# Patient Record
Sex: FEMALE | Race: WHITE | NOT HISPANIC OR LATINO | URBAN - METROPOLITAN AREA
[De-identification: names, ages, dates, MRNs, and addresses within clinical notes are randomized per-mention and may not be internally consistent; named-entity substitution may affect disease eponyms.]

---
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
## 2022-02-26 ENCOUNTER — TELEPHONE (OUTPATIENT)
Dept: FAMILY MEDICINE CLINIC | Facility: MEDICAL CENTER | Age: 22
End: 2022-02-26

## 2022-02-26 NOTE — TELEPHONE ENCOUNTER
Please remove [facility] as PCP patient has aged out of the practice and now goes to [address]       thank you

## 2022-05-26 NOTE — TELEPHONE ENCOUNTER
05/26/22 3:54 PM     Thank you for your request  Your request has been received, reviewed, and the patient chart updated  The PCP has successfully been removed with a patient attribution note  Please remind staff to not remove PCP at office level for this scenario; [facility] Department will remove  This message will now be completed      Thank you  Marisa Thomson

## 2024-05-20 ENCOUNTER — OFFICE VISIT (OUTPATIENT)
Dept: OBGYN CLINIC | Facility: HOSPITAL | Age: 24
End: 2024-05-20
Payer: COMMERCIAL

## 2024-05-20 ENCOUNTER — HOSPITAL ENCOUNTER (OUTPATIENT)
Dept: RADIOLOGY | Facility: HOSPITAL | Age: 24
Discharge: HOME | End: 2024-05-20
Attending: ORTHOPAEDIC SURGERY
Payer: COMMERCIAL

## 2024-05-20 VITALS — WEIGHT: 226 LBS | HEIGHT: 62 IN | BODY MASS INDEX: 41.59 KG/M2

## 2024-05-20 DIAGNOSIS — R52 PAIN: ICD-10-CM

## 2024-05-20 DIAGNOSIS — R52 PAIN: Primary | ICD-10-CM

## 2024-05-20 PROCEDURE — 72050 X-RAY EXAM NECK SPINE 4/5VWS: CPT

## 2024-05-20 PROCEDURE — 99204 OFFICE O/P NEW MOD 45 MIN: CPT | Performed by: ORTHOPAEDIC SURGERY

## 2024-05-20 RX ORDER — ALPRAZOLAM 0.25 MG/1
0.25 TABLET ORAL AS NEEDED
Qty: 2 TABLET | Refills: 0 | Status: SHIPPED | OUTPATIENT
Start: 2024-05-20

## 2024-05-20 RX ORDER — ACETAMINOPHEN 325 MG/1
500 TABLET ORAL EVERY 6 HOURS PRN
COMMUNITY

## 2024-05-20 RX ORDER — ALBUTEROL SULFATE 90 UG/1
2 AEROSOL, METERED RESPIRATORY (INHALATION) EVERY 6 HOURS PRN
COMMUNITY
Start: 2024-04-30

## 2024-05-20 RX ORDER — CYCLOBENZAPRINE HCL 5 MG
5 TABLET ORAL AS NEEDED
COMMUNITY
Start: 2024-04-24

## 2024-05-20 RX ORDER — LEVOTHYROXINE SODIUM 112 UG/1
1 TABLET ORAL DAILY
COMMUNITY
Start: 2024-03-19

## 2024-05-20 RX ORDER — LINACLOTIDE 145 UG/1
145 CAPSULE, GELATIN COATED ORAL DAILY
COMMUNITY
Start: 2024-03-07

## 2024-05-20 RX ORDER — METHOCARBAMOL 500 MG/1
500 TABLET, FILM COATED ORAL AS NEEDED
COMMUNITY
Start: 2024-04-26
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
## 2024-05-30 ENCOUNTER — HOSPITAL ENCOUNTER (OUTPATIENT)
Dept: RADIOLOGY | Facility: HOSPITAL | Age: 24
Discharge: HOME | End: 2024-05-30
Attending: ORTHOPAEDIC SURGERY
Payer: COMMERCIAL

## 2024-05-30 DIAGNOSIS — R52 PAIN: ICD-10-CM

## 2024-05-30 PROCEDURE — 72141 MRI NECK SPINE W/O DYE: CPT

## 2024-06-05 ENCOUNTER — OFFICE VISIT (OUTPATIENT)
Dept: OBGYN CLINIC | Facility: HOSPITAL | Age: 24
End: 2024-06-05
Payer: COMMERCIAL

## 2024-06-05 VITALS
WEIGHT: 227.07 LBS | HEIGHT: 62 IN | BODY MASS INDEX: 41.79 KG/M2 | HEART RATE: 106 BPM | DIASTOLIC BLOOD PRESSURE: 85 MMHG | SYSTOLIC BLOOD PRESSURE: 129 MMHG

## 2024-06-05 DIAGNOSIS — R20.0 NUMBNESS AND TINGLING IN BOTH HANDS: Primary | ICD-10-CM

## 2024-06-05 DIAGNOSIS — R20.2 NUMBNESS AND TINGLING IN BOTH HANDS: Primary | ICD-10-CM

## 2024-06-05 PROCEDURE — 99213 OFFICE O/P EST LOW 20 MIN: CPT | Performed by: ORTHOPAEDIC SURGERY
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
## 2024-07-16 ENCOUNTER — OFFICE VISIT (OUTPATIENT)
Dept: OBGYN CLINIC | Facility: HOSPITAL | Age: 24
End: 2024-07-16
Payer: COMMERCIAL

## 2024-07-16 VITALS
WEIGHT: 230.8 LBS | HEART RATE: 97 BPM | SYSTOLIC BLOOD PRESSURE: 113 MMHG | HEIGHT: 62 IN | DIASTOLIC BLOOD PRESSURE: 77 MMHG | BODY MASS INDEX: 42.47 KG/M2

## 2024-07-16 DIAGNOSIS — R20.2 NUMBNESS AND TINGLING IN BOTH HANDS: Primary | ICD-10-CM

## 2024-07-16 DIAGNOSIS — R20.0 NUMBNESS AND TINGLING IN BOTH HANDS: Primary | ICD-10-CM

## 2024-07-16 PROCEDURE — 99212 OFFICE O/P EST SF 10 MIN: CPT | Performed by: ORTHOPAEDIC SURGERY
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
## 2025-02-14 ENCOUNTER — OFFICE VISIT (OUTPATIENT)
Age: 25
End: 2025-02-14
Payer: COMMERCIAL

## 2025-02-14 VITALS
TEMPERATURE: 98.7 F | HEIGHT: 62 IN | HEART RATE: 99 BPM | WEIGHT: 236 LBS | BODY MASS INDEX: 43.43 KG/M2 | OXYGEN SATURATION: 99 %

## 2025-02-14 DIAGNOSIS — I77.4 MEDIAN ARCUATE LIGAMENT SYNDROME (HCC): Primary | ICD-10-CM

## 2025-02-14 DIAGNOSIS — R10.84 GENERALIZED ABDOMINAL PAIN: ICD-10-CM

## 2025-02-14 DIAGNOSIS — R11.2 NAUSEA AND VOMITING, UNSPECIFIED VOMITING TYPE: ICD-10-CM

## 2025-02-14 DIAGNOSIS — R19.7 DIARRHEA OF PRESUMED INFECTIOUS ORIGIN: ICD-10-CM

## 2025-02-14 DIAGNOSIS — R10.13 EPIGASTRIC PAIN: ICD-10-CM

## 2025-02-14 DIAGNOSIS — K58.2 IRRITABLE BOWEL SYNDROME WITH BOTH CONSTIPATION AND DIARRHEA: ICD-10-CM

## 2025-02-14 DIAGNOSIS — K21.9 GASTROESOPHAGEAL REFLUX DISEASE, UNSPECIFIED WHETHER ESOPHAGITIS PRESENT: ICD-10-CM

## 2025-02-14 DIAGNOSIS — R52 PAIN AGGRAVATED BY EATING OR DRINKING: ICD-10-CM

## 2025-02-14 PROCEDURE — 99204 OFFICE O/P NEW MOD 45 MIN: CPT | Performed by: NURSE PRACTITIONER

## 2025-02-14 RX ORDER — ONDANSETRON 4 MG/1
4 TABLET, ORALLY DISINTEGRATING ORAL AS NEEDED
COMMUNITY
End: 2025-02-14 | Stop reason: SDUPTHER

## 2025-02-14 RX ORDER — SENNOSIDES A AND B 8.6 MG/1
1 TABLET, FILM COATED ORAL DAILY
Qty: 30 TABLET | Refills: 2 | Status: SHIPPED | OUTPATIENT
Start: 2025-02-14

## 2025-02-14 RX ORDER — POLYETHYLENE GLYCOL 3350 17 G/17G
17 POWDER, FOR SOLUTION ORAL 2 TIMES DAILY
Qty: 578 G | Refills: 5 | Status: SHIPPED | OUTPATIENT
Start: 2025-02-14

## 2025-02-14 RX ORDER — ONDANSETRON 4 MG/1
4 TABLET, ORALLY DISINTEGRATING ORAL EVERY 6 HOURS SCHEDULED
Qty: 45 TABLET | Refills: 5 | Status: SHIPPED | OUTPATIENT
Start: 2025-02-14

## 2025-02-14 RX ORDER — BISACODYL 10 MG
10 SUPPOSITORY, RECTAL RECTAL DAILY
Qty: 12 SUPPOSITORY | Refills: 0 | Status: SHIPPED | OUTPATIENT
Start: 2025-02-14

## 2025-02-14 RX ORDER — OMEPRAZOLE 40 MG/1
40 CAPSULE, DELAYED RELEASE ORAL DAILY
Qty: 30 CAPSULE | Refills: 2 | Status: SHIPPED | OUTPATIENT
Start: 2025-02-14

## 2025-02-14 RX ORDER — FAMOTIDINE 40 MG/1
40 TABLET, FILM COATED ORAL
Qty: 30 TABLET | Refills: 2 | Status: SHIPPED | OUTPATIENT
Start: 2025-02-14

## 2025-02-14 NOTE — ASSESSMENT & PLAN NOTE
Orders:  •  Calprotectin,Fecal; Future  •  Clostridium difficile toxin by PCR with EIA; Future  •  IgA; Future  •  Ova and parasite examination; Future  •  Stool Enteric Bacterial Panel by PCR; Future  •  Fecal fat, qualitative; Future  •  Pancreatic elastase, fecal; Future

## 2025-02-14 NOTE — PATIENT INSTRUCTIONS
Proceed with HIDA Scan and U/S.  Proceed with consultation with Dr. Gonzales.   Proceed with stool studies as discussed.   Omeprazole 40 mg, 1 pill daily in AM prior to a meal.   Famotidine 40 mg, 1 pill daily at bed time.   Continue acidic or trigger foods as much as possible.   Start Miralax, 1 capful daily, can increase up to a max of 2 per day.   Start Senokot 1 pill at bed time, can increase to 2 pills.   IF you have NOT had a BM in 2-3 days, insert 1 Dulcolax suppository rectally at bed time.   Continue to try to drink at least 32-64 oz of water daily.   Can use Zofran every 6 hours as needed for nausea.   Continue to watch for red flag symptoms.   Schedule a f/u OV in 3 months.     We did review GI red flag symptoms, including, but not limited to: chronic nausea, vomiting, diarrhea, chills, fever, and unintentional weight loss and should call or contact our office with any changes or concerns. I reviewed with the patient that if they notice any blood while vomiting or in their stool they should contact or office or go to the nearest emergency room for immediate evaluation. The patient was agreeable and verbalized an understanding.

## 2025-02-14 NOTE — ASSESSMENT & PLAN NOTE
Can continue Zofran as needed for nausea and vomiting.  Encouraged the patient continue to try to stay hydrated, by drinking 32 to 64 ounces of water daily.  Orders:  •  ondansetron (ZOFRAN-ODT) 4 mg disintegrating tablet; Take 1 tablet (4 mg total) by mouth every 6 (six) hours  •  Ambulatory Referral to General Surgery; Future

## 2025-02-14 NOTE — ASSESSMENT & PLAN NOTE
Encouraged patient to proceed with a HIDA scan as scheduled later on this month as I also feel would be a good idea to rule out the gallbladder is another underlying possible component of her symptoms.  Orders:  •  Ambulatory Referral to General Surgery; Future

## 2025-02-14 NOTE — PROGRESS NOTES
Name: Annalise Do      : 2000      MRN: [id number]  Encounter Provider: SAMRA Osorio  Encounter Date: 2025   Encounter department: [facility]NE SMALL  :  Assessment & Plan  Median arcuate ligament syndrome (HCC)  While the patient was in the office today, I discussed with the patient that with the reason celiac/mesenteric duplex being suggestive of MALS and her brother does have a confirmed diagnosis, with similar symptoms, I do feel would be in her best interest to proceed with a repeat ultrasound/duplex as ordered later on this month and proceed with a consultation with Dr. Gonzales for further evaluation and possible treatment options.  The patient was agreeable and verbalized an understanding.    Orders:  •  Ambulatory Referral to General Surgery; Future    Nausea and vomiting, unspecified vomiting type  Can continue Zofran as needed for nausea and vomiting.  Encouraged the patient continue to try to stay hydrated, by drinking 32 to 64 ounces of water daily.  Orders:  •  ondansetron (ZOFRAN-ODT) 4 mg disintegrating tablet; Take 1 tablet (4 mg total) by mouth every 6 (six) hours  •  Ambulatory Referral to General Surgery; Future    Pain aggravated by eating or drinking  Encouraged patient to proceed with a HIDA scan as scheduled later on this month as I also feel would be a good idea to rule out the gallbladder is another underlying possible component of her symptoms.  Orders:  •  Ambulatory Referral to General Surgery; Future    Generalized abdominal pain  Orders:  •  Ambulatory Referral to General Surgery; Future    Gastroesophageal reflux disease, unspecified whether esophagitis present  While the patient was in the office today, I discussed with the patient that at this point in time since there was evidence of gastritis on her EGD I would like her to actually stop the Tagamet and increase the omeprazole to 40 mg daily in the morning and start an H2  blocker such as famotidine 40 mg daily at bedtime for the next 3 months and see how she does.  She is to contact our office if she has any side effects or issues with the changes in her medication regimen.  The patient was agreeable and verbalized an understanding.    Encouraged the patient to try to avoid acidic or trigger foods is much as possible.  Orders:  •  omeprazole (PriLOSEC) 40 MG capsule; Take 1 capsule (40 mg total) by mouth daily  •  famotidine (PEPCID) 40 MG tablet; Take 1 tablet (40 mg total) by mouth daily at bedtime    Epigastric pain  Orders:  •  omeprazole (PriLOSEC) 40 MG capsule; Take 1 capsule (40 mg total) by mouth daily  •  famotidine (PEPCID) 40 MG tablet; Take 1 tablet (40 mg total) by mouth daily at bedtime    Irritable bowel syndrome with both constipation and diarrhea  While the patient was in the office today, I did discuss with them that at this point we need to find a better bowel regimen with a goal of having a relieving, non-straining bowel movement at least every other day, if not daily. I discussed that our goal would also be that they never go past 2 days without having a bowel movement.     We agreed upon the following bowel regimen:  Start MiraLAX, 1 capful daily and can increase up to a max of 2 full capfuls per day as needed.  Start Senokot, 1 pill daily at bedtime and can increase to 2 pills daily.  If you have not had a bowel movement in 2 to 3 days, proceed with the Dulcolax suppository.  Continue to work on drinking at least 32 to 64 ounces of water daily.    In the meantime, because of the chronic loose stools and diarrhea we will proceed with stool studies to be thorough and rule out any possible underlying infectious or parasitic etiology as well as the possibility of EPI.  I advised the patient that once we have the results of the stool studies, our office will contact her to review the results and discuss any other treatment plan recommendations.  The patient was  agreeable and verbalized an understanding.    Orders:  •  Calprotectin,Fecal; Future  •  Clostridium difficile toxin by PCR with EIA; Future  •  IgA; Future  •  Ova and parasite examination; Future  •  Stool Enteric Bacterial Panel by PCR; Future  •  Fecal fat, qualitative; Future  •  Pancreatic elastase, fecal; Future  •  polyethylene glycol (GLYCOLAX) 17 GM/SCOOP powder; Take 17 g by mouth 2 (two) times a day  •  senna (SENOKOT) 8.6 MG tablet; Take 1 tablet (8.6 mg total) by mouth daily Take 1 PO HS.  •  bisacodyl (DULCOLAX) 10 mg suppository; Insert 1 suppository (10 mg total) into the rectum daily    Diarrhea of presumed infectious origin  Orders:  •  Calprotectin,Fecal; Future  •  Clostridium difficile toxin by PCR with EIA; Future  •  IgA; Future  •  Ova and parasite examination; Future  •  Stool Enteric Bacterial Panel by PCR; Future  •  Fecal fat, qualitative; Future  •  Pancreatic elastase, fecal; Future    The patient is to schedule a follow up office visit in 3 months, but understands to call or contact our offices with any issues before then or as needed.     History of Present Illness   HPI  Annalise Do is a 24 y.o. female who presents regarding her chronic nausea, vomiting, postprandial pain, generalized abdominal pain, recent imaging evidence of possible MALS, GERD symptoms, epigastric pain, and irritable bowel syndrome both constipation and diarrhea.  The patient and her mother present today for her initial consultation and transfer of care as her brother is one of our patients and has very similar issues and was diagnosed with MALS.  The patient for several years has had chronic nausea, vomiting, postprandial pain, generalized abdominal pain, GERD symptoms, and epigastric pain and recently has had to use Zofran on a regular and consistent basis in order to function.  She was previously following up with a [facility] gastroenterology team who just completed a celiac mesenteric duplex which was  suggestive of MALS and is actually scheduled for repeat duplex to concentrate more on the mesenteric artery.  She also has recently had a gastric emptying study which was normal.  She is also status post a recent EGD last month which showed mild gastritis.  She has had a colonoscopy 3 years ago which was also normal.    The patient and her mother report that they wanted to transfer their care to our group because our group was the one who was able to diagnose her brother and subsequently help her get a diagnosis that could explain some of her symptoms.  Her brother did see the general surgeon and had a celiac plexus block with interventional radiology that was successful and is going to be having surgery.  The patient reports that she is not sure she wants surgery but would like to know what her options are at this point.    The patient also has an upcoming HIDA scan scheduled to better evaluate the gallbladder as another underlying possible etiology.    She reports she is currently taking omeprazole 20 mg and Tagamet daily to try to help with the GERD symptoms, but continues with significant breakthrough symptoms and epigastric pain on a regular basis with early satiety as well.  She also continues to report left greater than right upper quadrant abdominal pain along with the epigastric pain but also has lower abdominal pain as well.  The patient reports that typically within a few minutes of eating she starts with significant gas and bloating.    She reports she has also been dealing with irritable bowel syndrome symptoms with both constipation and what sounds like overflow diarrhea and has previously tried MiraLAX on its own, Colace on its own, Senokot on its own, Gas-X, and recently had been on Linzess starting out at 72 mcg, which was not helpful and then at 145 mcg was causing significant diarrhea.  The patient is also previously tried and failed Amitiza which caused cramping and diarrhea as well.  She  "continues with constipation issues and bouts of straining when she is not having the overflow diarrhea.    The patient currently denies any dysphagia or unplanned weight loss. Water Intake: Minimal daily. Propel.     The patient currently reports that they have a BM 1-3 x daily to every 2-3 days and reports that it is not usually relieving, without any nocturnal BMs, constipation, melena or bloody stools.  Last BM: Today. Flatus: Sometimes.    PMH/PSH:   Abdominal/Chest Surgery: Denied.  Colon Cancer: Denied  Any Cancer: Denied  Pre-Cancerous Polyps: Denied  Crohn's: Denied  Ulcerative Colitis: Denied  Mera's Esophagus: Denied  Celiac Disease: Denied  Liver Disease: Denied    Tobacco/Vaping: Denied  ETOH: Denied  Marijuana: Denied  Illicit Drug Use: Denied    FH:  Colon Cancer: Denied  Any Cancer: Father; CLL  Family Members with Pre-Cancerous Polyps: Denied  Crohn's: Denied  Ulcerative Colitis: Denied  Celiac Disease: Denied  Liver Disease: Denied    Meds: Omeprazole 20 mg daily, Tagamet daily.  Daily NSAID Use: Denied  Daily Tylenol Use: Denied  Any New Meds: Denied      Imaging: (2/12/25) Gastric emptying study: Normal.    Endoscopy History: EGD: [facility] (1/2/25):  Mild duodenitis with focular gastric metaplasia.    COLONOSCOPY: (2022): Normal.      DUE: At 47     History obtained from: patient and patient's mother.    Review of Systems       Objective   Pulse 99   Temp 98.7 °F (37.1 °C) (Temporal)   Ht 5' 2\" (1.575 m)   Wt 107 kg (236 lb)   SpO2 99%   BMI 43.16 kg/m²      Physical Exam  Sclera without icterus and benign. Lung sounds clear to auscultation b/l. Normal S1 & S2 upon exam. Abdomen is soft, mildly distended, with mild to moderate pain noted upon exam in the epigastric region, left greater than right upper quadrant, and right lower quadrant, without any significant guarding or rebound tenderness noted upon exam, with faint bowel sounds x 4.  No edema noted of the b/l lower extremities upon exam " today. Skin is non-icteric.

## 2025-02-14 NOTE — ASSESSMENT & PLAN NOTE
While the patient was in the office today, I discussed with the patient that at this point in time since there was evidence of gastritis on her EGD I would like her to actually stop the Tagamet and increase the omeprazole to 40 mg daily in the morning and start an H2 blocker such as famotidine 40 mg daily at bedtime for the next 3 months and see how she does.  She is to contact our office if she has any side effects or issues with the changes in her medication regimen.  The patient was agreeable and verbalized an understanding.    Encouraged the patient to try to avoid acidic or trigger foods is much as possible.  Orders:  •  omeprazole (PriLOSEC) 40 MG capsule; Take 1 capsule (40 mg total) by mouth daily  •  famotidine (PEPCID) 40 MG tablet; Take 1 tablet (40 mg total) by mouth daily at bedtime

## 2025-02-14 NOTE — ASSESSMENT & PLAN NOTE
Orders:  •  omeprazole (PriLOSEC) 40 MG capsule; Take 1 capsule (40 mg total) by mouth daily  •  famotidine (PEPCID) 40 MG tablet; Take 1 tablet (40 mg total) by mouth daily at bedtime

## 2025-02-14 NOTE — ASSESSMENT & PLAN NOTE
While the patient was in the office today, I discussed with the patient that with the reason celiac/mesenteric duplex being suggestive of MALS and her brother does have a confirmed diagnosis, with similar symptoms, I do feel would be in her best interest to proceed with a repeat ultrasound/duplex as ordered later on this month and proceed with a consultation with Dr. Gonzales for further evaluation and possible treatment options.  The patient was agreeable and verbalized an understanding.    Orders:  •  Ambulatory Referral to General Surgery; Future

## 2025-02-14 NOTE — ASSESSMENT & PLAN NOTE
While the patient was in the office today, I did discuss with them that at this point we need to find a better bowel regimen with a goal of having a relieving, non-straining bowel movement at least every other day, if not daily. I discussed that our goal would also be that they never go past 2 days without having a bowel movement.     We agreed upon the following bowel regimen:  Start MiraLAX, 1 capful daily and can increase up to a max of 2 full capfuls per day as needed.  Start Senokot, 1 pill daily at bedtime and can increase to 2 pills daily.  If you have not had a bowel movement in 2 to 3 days, proceed with the Dulcolax suppository.  Continue to work on drinking at least 32 to 64 ounces of water daily.    In the meantime, because of the chronic loose stools and diarrhea we will proceed with stool studies to be thorough and rule out any possible underlying infectious or parasitic etiology as well as the possibility of EPI.  I advised the patient that once we have the results of the stool studies, our office will contact her to review the results and discuss any other treatment plan recommendations.  The patient was agreeable and verbalized an understanding.    Orders:  •  Calprotectin,Fecal; Future  •  Clostridium difficile toxin by PCR with EIA; Future  •  IgA; Future  •  Ova and parasite examination; Future  •  Stool Enteric Bacterial Panel by PCR; Future  •  Fecal fat, qualitative; Future  •  Pancreatic elastase, fecal; Future  •  polyethylene glycol (GLYCOLAX) 17 GM/SCOOP powder; Take 17 g by mouth 2 (two) times a day  •  senna (SENOKOT) 8.6 MG tablet; Take 1 tablet (8.6 mg total) by mouth daily Take 1 PO HS.  •  bisacodyl (DULCOLAX) 10 mg suppository; Insert 1 suppository (10 mg total) into the rectum daily

## 2025-03-07 ENCOUNTER — CONSULT (OUTPATIENT)
Dept: SURGERY | Facility: CLINIC | Age: 25
End: 2025-03-07
Payer: COMMERCIAL

## 2025-03-07 VITALS
HEART RATE: 90 BPM | SYSTOLIC BLOOD PRESSURE: 130 MMHG | DIASTOLIC BLOOD PRESSURE: 84 MMHG | BODY MASS INDEX: 43.35 KG/M2 | OXYGEN SATURATION: 99 % | WEIGHT: 237 LBS | TEMPERATURE: 98.2 F

## 2025-03-07 DIAGNOSIS — R10.84 GENERALIZED ABDOMINAL PAIN: ICD-10-CM

## 2025-03-07 DIAGNOSIS — I77.4 MEDIAN ARCUATE LIGAMENT SYNDROME (HCC): Primary | ICD-10-CM

## 2025-03-07 DIAGNOSIS — R52 PAIN AGGRAVATED BY EATING OR DRINKING: ICD-10-CM

## 2025-03-07 DIAGNOSIS — R11.2 NAUSEA AND VOMITING, UNSPECIFIED VOMITING TYPE: ICD-10-CM

## 2025-03-07 PROCEDURE — 99244 OFF/OP CNSLTJ NEW/EST MOD 40: CPT | Performed by: SURGERY

## 2025-03-07 NOTE — ASSESSMENT & PLAN NOTE
Possibly related to median arcuate ligament syndrome.  Although there is an element of constipation, which may be a contributing factor.    Orders:    Ambulatory Referral to General Surgery

## 2025-03-07 NOTE — ASSESSMENT & PLAN NOTE
24-year-old female with a 4 to 6-year history of upper abdominal pain, nausea, vomiting, and constipation.    Plan:  She has had a fairly extensive workup for her abdominal pain, nausea and vomiting.  I reviewed most of her workup  from [facility].  One of her CAT scans as well as her mesenteric duplex ultrasounds show findings consistent with median arcuate ligament syndrome.  None of her other workup has been revealing.  However, I would like to repeat some of her imaging specifically with a CTA of the abdomen pelvis to evaluate for radiographic signs of MALS.  If that is positive, would then refer to interventional radiology for celiac plexus block.  She can see me back after her CT scan.    Orders:    Ambulatory Referral to General Surgery    CTA abdomen pelvis w wo contrast; Future

## 2025-03-07 NOTE — PROGRESS NOTES
Name: Annalise Do      : 2000      MRN: [id number]  Encounter Provider: Avelino Gonzales MD  Encounter Date: 3/7/2025   [facility]  :  Assessment & Plan  Nausea and vomiting, unspecified vomiting type  Possibly related to median arcuate ligament syndrome.  Although there is an element of constipation, which may be a contributing factor.    Orders:    Ambulatory Referral to General Surgery    Median arcuate ligament syndrome (HCC)  24-year-old female with a 4 to 6-year history of upper abdominal pain, nausea, vomiting, and constipation.    Plan:  She has had a fairly extensive workup for her abdominal pain, nausea and vomiting.  I reviewed most of her workup  from [facility].  One of her CAT scans as well as her mesenteric duplex ultrasounds show findings consistent with median arcuate ligament syndrome.  None of her other workup has been revealing.  However, I would like to repeat some of her imaging specifically with a CTA of the abdomen pelvis to evaluate for radiographic signs of MALS.  If that is positive, would then refer to interventional radiology for celiac plexus block.  She can see me back after her CT scan.    Orders:    Ambulatory Referral to General Surgery    CTA abdomen pelvis w wo contrast; Future    Generalized abdominal pain    Orders:    Ambulatory Referral to General Surgery    Pain aggravated by eating or drinking    Orders:    Ambulatory Referral to General Surgery        History of Present Illness   HPI  Annalise Do is a 24 y.o. female who presents with nausea vomiting, and upper abdominal pain.  She has approximately a 6-year history of abdominal pain, nausea and vomiting.  Her pain is constant in her upper abdomen and in her left upper quadrant that is exacerbated by eating.  She is down to approximately 1 meal per day with a relatively stable weight over that period of time.  She has persistent nausea, and occasional episodes of  vomiting.  She has upper GI bloating and fullness as well as constipation as well.  She has been worked up extensively.  Most recently she underwent a CTA of the the abdomen pelvis on  9/6/2024 which does note show any specific pathology.  She also has a mesenteric duplex ultrasound which I reviewed from 1/29/2025 which shows elevated peak systolic velocities in the proximal celiac suggesting focal stenosis and there does appear to be a corresponding hooked appearance on the prior CT scan suggesting M additionally she underwent gastric emptying study on 2/12/2025 which shows 99% emptying at 4 hours consistent with normal gastric emptying.  Finally, she underwent CCK HIDA study on 2/20/2025 which shows a normal gallbladder ejection fraction.  I reviewed the notes in the chart from [facility] of gastroenterology from 2/14/2025.  History obtained from: patient and patient's parent    Review of Systems   Constitutional:  Negative for chills, fatigue and fever.   HENT:  Negative for ear pain, facial swelling, sinus pressure and sinus pain.    Eyes:  Negative for pain.   Respiratory:  Negative for cough, shortness of breath and wheezing.    Cardiovascular:  Negative for chest pain.   Gastrointestinal:  Positive for abdominal distention, abdominal pain, constipation, nausea and vomiting. Negative for diarrhea.   Endocrine: Negative for cold intolerance and heat intolerance.   Genitourinary:  Negative for dysuria and flank pain.   Musculoskeletal:  Negative for back pain and neck pain.   Skin:  Negative for wound.   Neurological:  Negative for syncope, facial asymmetry, light-headedness and numbness.   Psychiatric/Behavioral:  Negative for behavioral problems, confusion and suicidal ideas.      Past Medical History   No past medical history on file.  Past Surgical History:   Procedure Laterality Date    LIPOSUCTION Bilateral     on back of neck 2 times    PARATHYROIDECTOMY Bilateral     only one taken out and one  reattach    THYROIDECTOMY Bilateral      No family history on file.   reports that she has never smoked. She has never used smokeless tobacco.  Current Outpatient Medications   Medication Instructions    acetaminophen (TYLENOL) 500 mg, Every 6 hours PRN    albuterol (PROVENTIL HFA,VENTOLIN HFA) 90 mcg/act inhaler 2 puffs, Every 6 hours PRN    ALPRAZolam (XANAX) 0.25 mg, Oral, As needed, Take 1-2 tablets 1 hour prior to MRI. Do not drive or operate machinery. Please arrange for ride to and from MRI. Do not take any other sedative medications or alcohol while taking Xanax.    aspirin-acetaminophen-caffeine (EXCEDRIN MIGRAINE) 250-250-65 MG per tablet 1 tablet, Every 6 hours PRN    bisacodyl (DULCOLAX) 10 mg, Rectal, Daily    cyclobenzaprine (FLEXERIL) 5 mg, As needed    famotidine (PEPCID) 40 mg, Oral, Daily at bedtime    levothyroxine 112 mcg tablet 1 tablet, Daily    methocarbamol (ROBAXIN) 500 mg, As needed    Multiple Vitamin (MULTI VITAMIN DAILY PO) Take by mouth    omeprazole (PRILOSEC) 40 mg, Oral, Daily    ondansetron (ZOFRAN-ODT) 4 mg, Oral, Every 6 hours scheduled    polyethylene glycol (GLYCOLAX) 17 g, Oral, 2 times daily    senna (SENOKOT) 8.6 mg, Oral, Daily, Take 1 PO HS.     Allergies   Allergen Reactions    Morphine Other (See Comments)    Pollen Extract Nasal Congestion    Cephalexin Rash    Prochlorperazine Irritability      Current Outpatient Medications on File Prior to Visit   Medication Sig Dispense Refill    acetaminophen (TYLENOL) 325 mg tablet Take 500 mg by mouth every 6 (six) hours as needed      albuterol (PROVENTIL HFA,VENTOLIN HFA) 90 mcg/act inhaler Inhale 2 puffs every 6 (six) hours as needed      ALPRAZolam (XANAX) 0.25 mg tablet Take 1 tablet (0.25 mg total) by mouth if needed for anxiety Take 1-2 tablets 1 hour prior to MRI. Do not drive or operate machinery. Please arrange for ride to and from MRI. Do not take any other sedative medications or alcohol while taking Xanax. 2 tablet 0     aspirin-acetaminophen-caffeine (EXCEDRIN MIGRAINE) 250-250-65 MG per tablet Take 1 tablet by mouth every 6 (six) hours as needed      bisacodyl (DULCOLAX) 10 mg suppository Insert 1 suppository (10 mg total) into the rectum daily 12 suppository 0    cyclobenzaprine (FLEXERIL) 5 mg tablet Take 5 mg by mouth if needed for muscle spasms As needed at night      famotidine (PEPCID) 40 MG tablet Take 1 tablet (40 mg total) by mouth daily at bedtime 30 tablet 2    levothyroxine 112 mcg tablet Take 1 tablet by mouth daily      methocarbamol (ROBAXIN) 500 mg tablet Take 500 mg by mouth if needed for muscle spasms As needed during the day      Multiple Vitamin (MULTI VITAMIN DAILY PO) Take by mouth      omeprazole (PriLOSEC) 40 MG capsule Take 1 capsule (40 mg total) by mouth daily 30 capsule 2    ondansetron (ZOFRAN-ODT) 4 mg disintegrating tablet Take 1 tablet (4 mg total) by mouth every 6 (six) hours 45 tablet 5    polyethylene glycol (GLYCOLAX) 17 GM/SCOOP powder Take 17 g by mouth 2 (two) times a day 578 g 5    senna (SENOKOT) 8.6 MG tablet Take 1 tablet (8.6 mg total) by mouth daily Take 1 PO HS. 30 tablet 2     No current facility-administered medications on file prior to visit.      Social History     Tobacco Use    Smoking status: Never    Smokeless tobacco: Never   Substance and Sexual Activity    Alcohol use: Not on file    Drug use: Not on file    Sexual activity: Not on file        Objective   /84 (BP Location: Right arm, Patient Position: Sitting, Cuff Size: Adult)   Pulse 90   Temp 98.2 °F (36.8 °C) (Temporal)   Wt 108 kg (237 lb)   SpO2 99%   BMI 43.35 kg/m²      Physical Exam  Vitals and nursing note reviewed.   Constitutional:       General: She is not in acute distress.     Appearance: Normal appearance. She is obese. She is not ill-appearing.   HENT:      Head: Normocephalic and atraumatic.      Mouth/Throat:      Mouth: Mucous membranes are moist.   Eyes:      Extraocular Movements:  Extraocular movements intact.   Cardiovascular:      Rate and Rhythm: Normal rate and regular rhythm.   Pulmonary:      Effort: Pulmonary effort is normal. No respiratory distress.      Breath sounds: Normal breath sounds. No stridor.   Abdominal:      General: There is no distension.      Palpations: Abdomen is soft. There is no mass.      Tenderness: There is no abdominal tenderness.      Hernia: No hernia is present.      Comments: No tenderness throughout.   Musculoskeletal:         General: No swelling or tenderness. Normal range of motion.   Skin:     General: Skin is warm and dry.      Findings: No lesion.   Neurological:      General: No focal deficit present.      Mental Status: She is alert and oriented to person, place, and time.   Psychiatric:         Mood and Affect: Mood normal.         Behavior: Behavior normal.

## 2025-03-13 ENCOUNTER — HOSPITAL ENCOUNTER (OUTPATIENT)
Dept: RADIOLOGY | Facility: HOSPITAL | Age: 25
Discharge: HOME | End: 2025-03-13
Attending: SURGERY
Payer: COMMERCIAL

## 2025-03-13 DIAGNOSIS — I77.4 MEDIAN ARCUATE LIGAMENT SYNDROME (HCC): ICD-10-CM

## 2025-03-13 PROCEDURE — 74174 CTA ABD&PLVS W/CONTRAST: CPT

## 2025-03-13 RX ADMIN — IOHEXOL 85 ML: 350 INJECTION, SOLUTION INTRAVENOUS at 14:44

## 2025-03-16 PROBLEM — R19.7 DIARRHEA OF PRESUMED INFECTIOUS ORIGIN: Status: RESOLVED | Noted: 2025-02-14 | Resolved: 2025-03-16

## 2025-03-31 DIAGNOSIS — G62.89 OTHER POLYNEUROPATHY: Primary | ICD-10-CM

## 2025-04-04 ENCOUNTER — TELEMEDICINE (OUTPATIENT)
Dept: INTERVENTIONAL RADIOLOGY/VASCULAR | Facility: CLINIC | Age: 25
End: 2025-04-04

## 2025-04-04 DIAGNOSIS — I77.4 MEDIAN ARCUATE LIGAMENT SYNDROME (HCC): ICD-10-CM

## 2025-04-04 PROCEDURE — 99244 OFF/OP CNSLTJ NEW/EST MOD 40: CPT | Performed by: RADIOLOGY

## 2025-04-04 RX ORDER — SODIUM CHLORIDE 9 MG/ML
30 INJECTION, SOLUTION INTRAVENOUS CONTINUOUS
OUTPATIENT
Start: 2025-04-04

## 2025-04-04 NOTE — PROGRESS NOTES
Virtual Regular VisitName: Annalise Do      : 2000      MRN: [id number]  Encounter Provider: Caryn Sánchez MD  Encounter Date: 2025   Encounter department: [facility] INTERVENTIONAL RADIOLOGY [place]  :  Assessment & Plan  Median arcuate ligament syndrome (HCC)    Orders:    Ambulatory Referral to Interventional Radiology    IR celiac block; Future      24-year-old female referred by Dr. Gonzales of general surgery  Abdominal Symptoms for years  Currently Eating once a day  Gagging throwing up    Predominantly, this is nausea and vomiting  This all originally started 8th 9 th grade  Thyroid taken out   Additionally, struggles with constipation  Goes days without bowel movement Senna/PEG helps    Did have GI team at [facility] -  low méndez  Has had a fairly extensive workup    Has gained and lost weight over last 6-12 months  Currently claims she eats once a day    She has seen general surgery and there has been elevated velocities on duplex of the celiac artery    She has seen Dr. Gonzales who has repeated a CTA of the abdomen and pelvis    Images are equivocal for median arcuate ligament syndrome.  Certainly her compression is at best moderate but the velocities are more than twice the aortic velocities on duplex    Her brother has same thing, he had 'IR test' done which appears to be a celiac block    During our conversation, mother is on he phone    Some symptoms do not really fit with celiac artery compression, for example She always has pain / dicomfort  Physically she has a sensitive stomach, doesn't wear jeans    We reviewed the technique for the procedure including needle insertion and injection of local anesthetic  We reviewed that this is mostly a diagnostic test with no expectation of long-term therapy as celiac compression by the median arcuate ligament is a surgical problem     we reviewed pertinent Data for the procedure:  Allergies - morphine headaches  No thinners  Snores but no sleep  apnea    Very bad anxiety  I discussed that sedation often helps with anxiety    I discussed the role of obesity with body mass index 43 and how this can simultaneously make the procedure more difficult as we need a longer needle path but also gives us some visceral fat that allows room between adjacent organs such as the kidneys     She wishes to proceed and we will begin the scheduling process            Lidocaine  Bupivicaine      She works at [employer]  1-2 days off planned  Same day procedure              History of Present Illness     HPI  Review of Systems    Objective   There were no vitals taken for this visit.    Physical Exam    Administrative Statements   Encounter provider Caryn Sánchez MD    The Patient is located at Home and in the following state in which I hold an active license [place].    The patient was identified by name and date of birth. Annalise Do was informed that this is a telemedicine visit and that the visit is being conducted through the Epic Embedded platform. She agrees to proceed..  My office door was closed. No one else was in the room.  She acknowledged consent and understanding of privacy and security of the video platform. The patient has agreed to participate and understands they can discontinue the visit at any time.    I have spent a total time of 28 minutes in caring for this patient on the day of the visit/encounter including Diagnostic results, Prognosis, Risks and benefits of tx options, Patient and family education, Risk factor reductions, Impressions, Counseling / Coordination of care, Documenting in the medical record, Reviewing/placing orders in the medical record (including tests, medications, and/or procedures), Obtaining or reviewing history  , and Communicating with other healthcare professionals , not including the time spent for establishing the audio/video connection.

## 2025-04-04 NOTE — LETTER
2025     Avelino Gonzales MD  [address]  [address]  [place] [facility] [address]    Patient: Annalise Do   YOB: 2000   Date of Visit: 2025       Dear Dr. Avelino Gonzales MD:    Thank you for referring Annalise Do to me for evaluation. Below are my notes for this consultation.    If you have questions, please do not hesitate to call me. I look forward to following your patient along with you.         Sincerely,        Caryn Sánchez MD        CC: Hyun Sánchez MD  2025  3:55 PM  Sign when Signing Visit  Virtual Regular VisitName: Annalise Do      : 2000      MRN: [id number]  Encounter Provider: Caryn Sánchez MD  Encounter Date: 2025   Encounter department: [facility] INTERVENTIONAL RADIOLOGY [place]  :  Assessment & Plan  Median arcuate ligament syndrome (HCC)    Orders:  •  Ambulatory Referral to Interventional Radiology  •  IR celiac block; Future      24-year-old female referred by Dr. Gonzales of general surgery  Abdominal Symptoms for years  Currently Eating once a day  Gagging throwing up    Predominantly, this is nausea and vomiting  This all originally started 8th 9 th grade  Thyroid taken out   Additionally, struggles with constipation  Goes days without bowel movement Senna/PEG helps    Did have GI team at [facility] low m[facility]dez  Has had a fairly extensive workup    Has gained and lost weight over last 6-12 months  Currently claims she eats once a day    She has seen general surgery and there has been elevated velocities on duplex of the celiac artery    She has seen Dr. Gonzales who has repeated a CTA of the abdomen and pelvis    Images are equivocal for median arcuate ligament syndrome.  Certainly her compression is at best moderate but the velocities are more than twice the aortic velocities on duplex    Her brother has same thing, he had 'IR test' done which appears to be a celiac block    During our conversation, mother is on he phone    Some  symptoms do not really fit with celiac artery compression, for example She always has pain / dicomfort  Physically she has a sensitive stomach, doesn't wear jeans    We reviewed the technique for the procedure including needle insertion and injection of local anesthetic  We reviewed that this is mostly a diagnostic test with no expectation of long-term therapy as celiac compression by the median arcuate ligament is a surgical problem     we reviewed pertinent Data for the procedure:  Allergies - morphine headaches  No thinners  Snores but no sleep apnea    Very bad anxiety  I discussed that sedation often helps with anxiety    I discussed the role of obesity with body mass index 43 and how this can simultaneously make the procedure more difficult as we need a longer needle path but also gives us some visceral fat that allows room between adjacent organs such as the kidneys     She wishes to proceed and we will begin the scheduling process            Lidocaine  Bupivicaine      She works at [employer]  1-2 days off planned  Same day procedure              History of Present Illness{?Quick Links Encounters * My Last Note * Last Note in Specialty * Snapshot * Since Last Visit * History :03086}    HPI  Review of Systems    Objective{?Quick Links Trend Vitals * Enter New Vitals * Results Review * Timeline (Adult) * Labs * Imaging * Cardiology * Procedures * Lung Cancer Screening * Surgical eConsent :37603}  There were no vitals taken for this visit.    Physical Exam    Administrative Statements  Encounter provider Caryn Sánchez MD    The Patient is located at Home and in the following state in which I hold an active license [place].    The patient was identified by name and date of birth. Annalise Do was informed that this is a telemedicine visit and that the visit is being conducted through {[employer] VIRTUAL VISIT MEDIUM:38785}.  {Telemedicine confidentiality :24345} {Telemedicine participants:55151}  She acknowledged consent  and understanding of privacy and security of the video platform. The patient has agreed to participate and understands they can discontinue the visit at any time.    I have spent a total time of *** minutes in caring for this patient on the day of the visit/encounter including {AMB Counseling Topics:[id number]}, not including the time spent for establishing the audio/video connection.

## 2025-04-04 NOTE — LETTER
2025     Avelino Gonzales MD  [address]  [address]  [place] [address]    Patient: Annalise Do   YOB: 2000   Date of Visit: 2025       Dear Dr. Avelino Gonzales MD:    Thank you for referring Annalise Do to me for evaluation. Below are my notes for this consultation.    If you have questions, please do not hesitate to call me. I look forward to following your patient along with you.         Sincerely,        Caryn Sánchez MD        CC: Hyun Sánchez MD  2025  3:56 PM  Sign when Signing Visit  Virtual Regular VisitName: Annalise Do      : 2000      MRN: [id number]  Encounter Provider: Caryn Sánchez MD  Encounter Date: 2025   Encounter department: [facility] INTERVENTIONAL RADIOLOGY [place]  :  Assessment & Plan  Median arcuate ligament syndrome (HCC)    Orders:  •  Ambulatory Referral to Interventional Radiology  •  IR celiac block; Future      24-year-old female referred by Dr. Gonzales of general surgery  Abdominal Symptoms for years  Currently Eating once a day  Gagging throwing up    Predominantly, this is nausea and vomiting  This all originally started 8th 9 th grade  Thyroid taken out   Additionally, struggles with constipation  Goes days without bowel movement Senna/PEG helps    Did have GI team at [facility] low méndez  Has had a fairly extensive workup    Has gained and lost weight over last 6-12 months  Currently claims she eats once a day    She has seen general surgery and there has been elevated velocities on duplex of the celiac artery    She has seen Dr. Gonzales who has repeated a CTA of the abdomen and pelvis    Images are equivocal for median arcuate ligament syndrome.  Certainly her compression is at best moderate but the velocities are more than twice the aortic velocities on duplex    Her brother has same thing, he had 'IR test' done which appears to be a celiac block    During our conversation, mother is on he phone    Some  symptoms do not really fit with celiac artery compression, for example She always has pain / dicomfort  Physically she has a sensitive stomach, doesn't wear jeans    We reviewed the technique for the procedure including needle insertion and injection of local anesthetic  We reviewed that this is mostly a diagnostic test with no expectation of long-term therapy as celiac compression by the median arcuate ligament is a surgical problem     we reviewed pertinent Data for the procedure:  Allergies - morphine headaches  No thinners  Snores but no sleep apnea    Very bad anxiety  I discussed that sedation often helps with anxiety    I discussed the role of obesity with body mass index 43 and how this can simultaneously make the procedure more difficult as we need a longer needle path but also gives us some visceral fat that allows room between adjacent organs such as the kidneys     She wishes to proceed and we will begin the scheduling process            Lidocaine  Bupivicaine      She works at [employer]  1-2 days off planned  Same day procedure              History of Present Illness    HPI  Review of Systems    Objective  There were no vitals taken for this visit.    Physical Exam    Administrative Statements  Encounter provider Caryn Sánchez MD    The Patient is located at Home and in the following state in which I hold an active license [place].    The patient was identified by name and date of birth. Annalise Do was informed that this is a telemedicine visit and that the visit is being conducted through the Epic Embedded platform. She agrees to proceed..  My office door was closed. No one else was in the room.  She acknowledged consent and understanding of privacy and security of the video platform. The patient has agreed to participate and understands they can discontinue the visit at any time.    I have spent a total time of 28 minutes in caring for this patient on the day of the visit/encounter including Diagnostic  results, Prognosis, Risks and benefits of tx options, Patient and family education, Risk factor reductions, Impressions, Counseling / Coordination of care, Documenting in the medical record, Reviewing/placing orders in the medical record (including tests, medications, and/or procedures), Obtaining or reviewing history  , and Communicating with other healthcare professionals , not including the time spent for establishing the audio/video connection.

## 2025-04-11 ENCOUNTER — TELEPHONE (OUTPATIENT)
Dept: RADIOLOGY | Facility: HOSPITAL | Age: 25
End: 2025-04-11

## 2025-04-11 NOTE — TELEPHONE ENCOUNTER
Called patient to schedule celiac block, stated she no longer has insurance. Patient will call back to schedule procedure once she has insurance.

## 2025-04-14 ENCOUNTER — TELEPHONE (OUTPATIENT)
Age: 25
End: 2025-04-14

## 2025-04-14 NOTE — TELEPHONE ENCOUNTER
PT's mother called asking for [facility] paperwork [place] office will call the PT's mother back for status update.

## 2025-04-23 NOTE — TELEPHONE ENCOUNTER
Mom is returning Pat's call regarding a question on pt's FMLA poaperwork.  I called the office and spoke Amisha and Pat is currently out to lunch so I told Maber I would have Pat call her back.  She stated she has to go to work at 2:00 so if she can please call her before that or tomorrow morning

## 2025-05-06 ENCOUNTER — TELEPHONE (OUTPATIENT)
Dept: RADIOLOGY | Facility: HOSPITAL | Age: 25
End: 2025-05-06

## 2025-05-06 NOTE — TELEPHONE ENCOUNTER
PT called in to let us know her insurance is now active and she is ready to schedule for her Celiac Block. Scheduled pt for 6/13 at [facility] with Dr Sánchez.

## 2025-05-12 ENCOUNTER — CONSULT (OUTPATIENT)
Dept: NEUROLOGY | Facility: CLINIC | Age: 25
End: 2025-05-12
Payer: COMMERCIAL

## 2025-05-12 VITALS
DIASTOLIC BLOOD PRESSURE: 64 MMHG | SYSTOLIC BLOOD PRESSURE: 116 MMHG | TEMPERATURE: 98 F | HEART RATE: 104 BPM | WEIGHT: 239 LBS | BODY MASS INDEX: 43.71 KG/M2 | OXYGEN SATURATION: 99 %

## 2025-05-12 DIAGNOSIS — E34.9 ENDOCRINOPATHY: Primary | ICD-10-CM

## 2025-05-12 DIAGNOSIS — G62.89 OTHER POLYNEUROPATHY: ICD-10-CM

## 2025-05-12 PROBLEM — E06.3 HASHIMOTO'S THYROIDITIS: Status: ACTIVE | Noted: 2025-05-12

## 2025-05-12 PROCEDURE — 99244 OFF/OP CNSLTJ NEW/EST MOD 40: CPT | Performed by: PSYCHIATRY & NEUROLOGY

## 2025-05-12 NOTE — PROGRESS NOTES
"Name: Annalise Do      : 2000      MRN: [id number]  Encounter Provider: Joselito Victoria MD  Encounter Date: 2025   Encounter department: [facility] [place]  :  Assessment & Plan  Other polyneuropathy    Orders:    Ambulatory Referral to Neurology    Endocrinopathy  Ms Do reports intermittent numbness and tingling of all 4 extremities with weakness.  Cervical spine disease is a consideration but was not confirmed with cervical spine MRI, which I personally reviewed.  She has no objective deficits on neurologic examination.  She does have a very significant history of what she describes as a \"buffalo hump\" with a massive goiter causing upper airway obstruction.  She needs a cranial MRI to make sure that there is no structural abnormality to account for her symptoms, in particular in the region of the pituitary gland.  However, with her history I am much more concerned that there may be a yet unidentified endocrine process that could be causing her neurologic symptoms.  Obviously, if her MRI shows an intracranial abnormalities and further measures would be considered at that time.  However, I am going to refer her to an endocrinologist for comprehensive evaluation.  I explained all of the above to her and answered all of her questions.  She is in agreement with this plan.    Orders:    MRI brain pituitary wo and w contrast; Future    Ambulatory Referral to Endocrinology; Future          History of Present Illness   HPI   I had the pleasure of seeing Annalise Do, a 24 y.o. female, for neuromuscular consultation. The referral was for weakness.   Please be aware of the inherent limitations of voice recognition software, which may result in transcriptional errors.  She has a history of \"buffalo hump\" that was surgically resected twice.  Apparently there was documentation that this represented a lipoma at some point but I cannot find confirmation in the available pathology " "records.  She also has a history of upper airway obstruction that was found to be due to a \"massive goiter\" requiring surgical resection.  At some point she was also diagnosed with Hashimoto's thyroiditis.  Apparently, one of her parathyroid glands was removed during her surgery.  Review of her records shows at least 1 very low serum cortisol level although other levels were normal.  In the last few years she reports intermittent \"numbness\" of all 4 extremities, worse on the right.  Her extremities feel \"dead, droppy\" with intermittent loss of feeling.  When she tries to give her boyfriend a neck massage her arms may become relatively limp.  She remembers an episode where she had to call her mom because she was not sure if her feet were on the gas pedals or not.  She has a history of prominent urinary urgency but otherwise denies constipation.  She has a tendency to stumble while walking.  She has a history of headaches and floaters although eye examination was apparently negative.  She had seen a neurologist with [facility]; EMG of the right arm was interpreted as normal.  MRI of the cervical spine which I personally reviewed showed no significant abnormality although at the margins of the films I thought that there was quite a bit of CSF signal anterior to the cerebellum.  Her last cranial MRI was performed in 2017 and was apparently normal.  Neurologic symptoms occur most days for at least a few hours at a time.    Review of Systems   Constitutional:  Negative for appetite change, fatigue and fever.   HENT: Negative.  Negative for hearing loss, tinnitus, trouble swallowing and voice change.    Eyes: Negative.  Negative for photophobia, pain and visual disturbance.   Respiratory: Negative.  Negative for shortness of breath.    Cardiovascular: Negative.  Negative for palpitations.   Gastrointestinal: Negative.  Negative for nausea and vomiting.   Endocrine: Negative.  Negative for cold " intolerance.   Genitourinary: Negative.  Negative for dysuria, frequency and urgency.   Musculoskeletal:  Positive for gait problem (balance issues). Negative for back pain, myalgias, neck pain and neck stiffness.   Skin: Negative.  Negative for rash.   Allergic/Immunologic: Negative.    Neurological:  Positive for dizziness, numbness (arms/legs x yrs) and headaches. Negative for tremors, seizures, syncope, facial asymmetry, speech difficulty, weakness and light-headedness.   Hematological: Negative.  Does not bruise/bleed easily.   Psychiatric/Behavioral:  Positive for decreased concentration. Negative for confusion, hallucinations and sleep disturbance.         Forgetful    I have personally reviewed the MA's review of systems and made changes as necessary.      No visits with results within 6 Month(s) from this visit.   Latest known visit with results is:   No results found for any previous visit.        No results found for this or any previous visit.     No results found for this or any previous visit.    No results found for this or any previous visit.    Results for orders placed during the hospital encounter of 05/30/24    MRI cervical spine wo contrast    Narrative  MRI CERVICAL SPINE WITHOUT CONTRAST    INDICATION: R52: Pain, unspecified.    COMPARISON: 2/24/2023    TECHNIQUE:  Multiplanar, multisequence imaging of the cervical spine was performed. .      IMAGE QUALITY:  Diagnostic    FINDINGS:    ALIGNMENT:  Normal alignment of the cervical spine.  No compression fracture.  No subluxation.  No scoliosis.    MARROW SIGNAL:  Normal marrow signal is identified within the visualized bony structures.  No discrete marrow lesion.    CERVICAL AND VISUALIZED THORACIC CORD:  Normal signal within the visualized cord.    PREVERTEBRAL AND PARASPINAL SOFT TISSUES:  Normal.    VISUALIZED POSTERIOR FOSSA:  The visualized posterior fossa demonstrates no abnormal signal.    CERVICAL DISC SPACES:    C2-C3:   Normal.    C3-C4:  Normal.    C4-C5:  Normal.    C5-C6:  Normal.    C6-C7: Minimal annular bulging without discrete disc herniation, canal stenosis or foraminal narrowing.    C7-T1:  Normal.    UPPER THORACIC DISC SPACES:  Normal.    OTHER FINDINGS:  None.    Impression  Minimal cervical degenerative change at the C5-6 and C6-7 levels without canal stenosis or foraminal narrowing.          Workstation performed: [id number]    No results found for this or any previous visit.    No results found for this or any previous visit.    No results found for this or any previous visit.    No results found for this or any previous visit.    No results found for this or any previous visit.    Results for orders placed in visit on 05/20/24    MRI cervical spine w wo contrast    No results found for this or any previous visit.    No results found for this or any previous visit.      Medical History Reviewed by provider this encounter:     .  Past Medical History   No past medical history on file.  Past Surgical History:   Procedure Laterality Date    LIPOSUCTION Bilateral     on back of neck 2 times    PARATHYROIDECTOMY Bilateral     only one taken out and one reattach    THYROIDECTOMY Bilateral      No family history on file.   reports that she has never smoked. She has never used smokeless tobacco.  Current Outpatient Medications   Medication Instructions    acetaminophen (TYLENOL) 500 mg, Every 6 hours PRN    albuterol (PROVENTIL HFA,VENTOLIN HFA) 90 mcg/act inhaler 2 puffs, Every 6 hours PRN    ALPRAZolam (XANAX) 0.25 mg, Oral, As needed, Take 1-2 tablets 1 hour prior to MRI. Do not drive or operate machinery. Please arrange for ride to and from MRI. Do not take any other sedative medications or alcohol while taking Xanax.    aspirin-acetaminophen-caffeine (EXCEDRIN MIGRAINE) 250-250-65 MG per tablet 1 tablet, Every 6 hours PRN    bisacodyl (DULCOLAX) 10 mg, Rectal, Daily    cyclobenzaprine (FLEXERIL) 5 mg, As needed     famotidine (PEPCID) 40 mg, Oral, Daily at bedtime    levothyroxine 112 mcg tablet 1 tablet, Daily    methocarbamol (ROBAXIN) 500 mg, As needed    Multiple Vitamin (MULTI VITAMIN DAILY PO) Take by mouth    omeprazole (PRILOSEC) 40 mg, Oral, Daily    ondansetron (ZOFRAN-ODT) 4 mg, Oral, Every 6 hours scheduled    polyethylene glycol (GLYCOLAX) 17 g, Oral, 2 times daily    senna (SENOKOT) 8.6 mg, Oral, Daily, Take 1 PO HS.     Allergies   Allergen Reactions    Morphine Other (See Comments)    Pollen Extract Nasal Congestion    Cephalexin Rash    Prochlorperazine Irritability      Current Outpatient Medications on File Prior to Visit   Medication Sig Dispense Refill    acetaminophen (TYLENOL) 325 mg tablet Take 500 mg by mouth every 6 (six) hours as needed      albuterol (PROVENTIL HFA,VENTOLIN HFA) 90 mcg/act inhaler Inhale 2 puffs every 6 (six) hours as needed      ALPRAZolam (XANAX) 0.25 mg tablet Take 1 tablet (0.25 mg total) by mouth if needed for anxiety Take 1-2 tablets 1 hour prior to MRI. Do not drive or operate machinery. Please arrange for ride to and from MRI. Do not take any other sedative medications or alcohol while taking Xanax. 2 tablet 0    aspirin-acetaminophen-caffeine (EXCEDRIN MIGRAINE) 250-250-65 MG per tablet Take 1 tablet by mouth every 6 (six) hours as needed      bisacodyl (DULCOLAX) 10 mg suppository Insert 1 suppository (10 mg total) into the rectum daily 12 suppository 0    cyclobenzaprine (FLEXERIL) 5 mg tablet Take 5 mg by mouth if needed for muscle spasms As needed at night      famotidine (PEPCID) 40 MG tablet Take 1 tablet (40 mg total) by mouth daily at bedtime 30 tablet 2    levothyroxine 112 mcg tablet Take 1 tablet by mouth daily      Multiple Vitamin (MULTI VITAMIN DAILY PO) Take by mouth      omeprazole (PriLOSEC) 40 MG capsule Take 1 capsule (40 mg total) by mouth daily 30 capsule 2    ondansetron (ZOFRAN-ODT) 4 mg disintegrating tablet Take 1 tablet (4 mg total) by mouth every  6 (six) hours 45 tablet 5    polyethylene glycol (GLYCOLAX) 17 GM/SCOOP powder Take 17 g by mouth 2 (two) times a day (Patient taking differently: Take 17 g by mouth as needed) 578 g 5    senna (SENOKOT) 8.6 MG tablet Take 1 tablet (8.6 mg total) by mouth daily Take 1 PO HS. 30 tablet 2    methocarbamol (ROBAXIN) 500 mg tablet Take 500 mg by mouth if needed for muscle spasms As needed during the day (Patient not taking: Reported on 5/12/2025)       No current facility-administered medications on file prior to visit.      Social History     Tobacco Use    Smoking status: Never    Smokeless tobacco: Never   Substance and Sexual Activity    Alcohol use: Not on file    Drug use: Not on file    Sexual activity: Not on file        Objective   /64 (BP Location: Right arm, Patient Position: Sitting, Cuff Size: Adult)   Pulse 104   Temp 98 °F (36.7 °C) (Temporal)   Wt 108 kg (239 lb)   SpO2 99%   BMI 43.71 kg/m²     Physical Exam  Neurological Exam  Well developed, well nourished, in no acute distress    Normocephalic, atraumatic    Heart: regular rate and rhythm    Extremities: no clubbing, cyanosis, or edema    Speech and cognition appeared normal    Cranial nerves:  II: Pupils equal, round, and reactive to light. No gross visual field defect. I did not appreciate optic disc edema.  III, IV, VI: Extraocular movements intact  V: Normal facial sensation in all three divisions of the trigeminal nerve bilaterally  VII: Normal facial strength  VIII: Hearing intact to finger rub bilaterally  IX, X: Palate elevated symmetrically  XI: Sternocleidomastoid strength normal bilaterally  XII: Tongue protruded in midline without atrophy or fibrillations    Motor:  Normal tone and bulk throughout. Muscle strength testing by the MRC scale was 5/5 in the deltoid, biceps, triceps, wrist extensors, wrist flexors, finger extensors, finger flexors, hip flexors, quadriceps, ankle dorsiflexors, ankle plantar flexors, and EHL  bilaterally    Deep tendon reflexes:   2+ and symmetrical in the biceps, triceps, brachioradialis, patellas, and ankles  Toes downgoing (no Babinski sign)  No Snyder's sign    Sensation: Normal pinprick and light touch throughout    Cerebellar: normal finger to nose and heel to shin testing    Gait: Normal

## 2025-05-12 NOTE — ASSESSMENT & PLAN NOTE
"Ms Do reports intermittent numbness and tingling of all 4 extremities with weakness.  Cervical spine disease is a consideration but was not confirmed with cervical spine MRI, which I personally reviewed.  She has no objective deficits on neurologic examination.  She does have a very significant history of what she describes as a \"buffalo hump\" with a massive goiter causing upper airway obstruction.  She needs a cranial MRI to make sure that there is no structural abnormality to account for her symptoms, in particular in the region of the pituitary gland.  However, with her history I am much more concerned that there may be a yet unidentified endocrine process that could be causing her neurologic symptoms.  Obviously, if her MRI shows an intracranial abnormalities and further measures would be considered at that time.  However, I am going to refer her to an endocrinologist for comprehensive evaluation.  I explained all of the above to her and answered all of her questions.  She is in agreement with this plan.    Orders:    MRI brain pituitary wo and w contrast; Future    Ambulatory Referral to Endocrinology; Future    "

## 2025-05-22 ENCOUNTER — OFFICE VISIT (OUTPATIENT)
Age: 25
End: 2025-05-22
Payer: COMMERCIAL

## 2025-05-22 VITALS
HEIGHT: 62 IN | DIASTOLIC BLOOD PRESSURE: 80 MMHG | RESPIRATION RATE: 18 BRPM | SYSTOLIC BLOOD PRESSURE: 120 MMHG | OXYGEN SATURATION: 98 % | WEIGHT: 240 LBS | HEART RATE: 98 BPM | TEMPERATURE: 98.2 F | BODY MASS INDEX: 44.16 KG/M2

## 2025-05-22 DIAGNOSIS — K21.9 GASTROESOPHAGEAL REFLUX DISEASE, UNSPECIFIED WHETHER ESOPHAGITIS PRESENT: ICD-10-CM

## 2025-05-22 DIAGNOSIS — K58.2 IRRITABLE BOWEL SYNDROME WITH BOTH CONSTIPATION AND DIARRHEA: ICD-10-CM

## 2025-05-22 DIAGNOSIS — R10.13 EPIGASTRIC PAIN: ICD-10-CM

## 2025-05-22 DIAGNOSIS — R11.2 NAUSEA AND VOMITING, UNSPECIFIED VOMITING TYPE: ICD-10-CM

## 2025-05-22 PROCEDURE — 99214 OFFICE O/P EST MOD 30 MIN: CPT | Performed by: NURSE PRACTITIONER

## 2025-05-22 RX ORDER — FAMOTIDINE 40 MG/1
40 TABLET, FILM COATED ORAL 2 TIMES DAILY
Qty: 60 TABLET | Refills: 3 | Status: SHIPPED | OUTPATIENT
Start: 2025-05-22

## 2025-05-22 RX ORDER — ONDANSETRON 4 MG/1
4 TABLET, ORALLY DISINTEGRATING ORAL EVERY 6 HOURS SCHEDULED
Qty: 45 TABLET | Refills: 3 | Status: SHIPPED | OUTPATIENT
Start: 2025-05-22

## 2025-05-22 RX ORDER — POLYETHYLENE GLYCOL 3350 17 G/17G
17 POWDER, FOR SOLUTION ORAL DAILY
Qty: 578 G | Refills: 3 | Status: SHIPPED | OUTPATIENT
Start: 2025-05-22

## 2025-05-22 RX ORDER — SENNOSIDES 8.6 MG/1
1 TABLET ORAL DAILY
Qty: 30 TABLET | Refills: 3 | Status: SHIPPED | OUTPATIENT
Start: 2025-05-22

## 2025-05-22 RX ORDER — OMEPRAZOLE 40 MG/1
40 CAPSULE, DELAYED RELEASE ORAL DAILY
Qty: 30 CAPSULE | Refills: 3 | Status: SHIPPED | OUTPATIENT
Start: 2025-05-22

## 2025-05-22 NOTE — PATIENT INSTRUCTIONS
Proceed Celiac Plexus Block with IR as scheduled.   Increase Famotidine 40 mg, 1 pill, 2 x daily.   Continue Omeprazole 40 gm, 1 pill daily in AM prior to a meal.   Continue to work 5-6 small, frequent meals daily.   Continue to take your time when eating, chew thoroughly and take small sips of water in between bites.   Continue Zofran as needed for nausea/vomiting.   Start Miralax 1/4-1 full capful every day and can titrate up/down depending on stool consistency/frequency.   Continue to try to drink at least 32-64 oz of water daily (2-4 glasses/bottles of water daily).   Continue to watch for red flag symptoms.   Schedule a f/u OV in 4 months.     We did review GI red flag symptoms, including, but not limited to: chronic nausea, vomiting, diarrhea, chills, fever, and unintentional weight loss and should call or contact our office with any changes or concerns. I reviewed with the patient that if they notice any blood while vomiting or in their stool they should contact or office or go to the nearest emergency room for immediate evaluation. The patient was agreeable and verbalized an understanding.

## 2025-05-22 NOTE — PROGRESS NOTES
Name: Annalise Do      : 2000      MRN: [id number]  Encounter Provider: SAMRA Osorio  Encounter Date: 2025   Encounter department: [facility]  :  Assessment & Plan  Nausea and vomiting, unspecified vomiting type  While the patient was in the office today, I discussed with the patient that I feel it is in her best interest to proceed with the celiac plexus block with interventional radiology as scheduled and to follow-up with Dr. Gonzales as scheduled as well.  The patient was agreeable and verbalized an understanding.    Continue to try to eat 5-6 small frequent meals daily.  Continue to take your time when eating, chewing thoroughly, and taking small sips of water in between bites.  Continue Zofran as needed for nausea and vomiting.  Orders:  •  ondansetron (ZOFRAN-ODT) 4 mg disintegrating tablet; Take 1 tablet (4 mg total) by mouth every 6 (six) hours    Gastroesophageal reflux disease, unspecified whether esophagitis present  Slightly Improved    Increased famotidine to 40 mg twice daily to try to help with some of the acid and bile like vomiting.  Continue omeprazole as prescribed.  Continue to avoid acidic or trigger foods much as possible.  Orders:  •  omeprazole (PriLOSEC) 40 MG capsule; Take 1 capsule (40 mg total) by mouth daily  •  famotidine (PEPCID) 40 MG tablet; Take 1 tablet (40 mg total) by mouth 2 (two) times a day    Epigastric pain  Orders:  •  omeprazole (PriLOSEC) 40 MG capsule; Take 1 capsule (40 mg total) by mouth daily  •  famotidine (PEPCID) 40 MG tablet; Take 1 tablet (40 mg total) by mouth 2 (two) times a day    Irritable bowel syndrome with both constipation and diarrhea  Slightly Improved    Start Miralax 1/4-1 full capful everyday or at least every other day, and can titrate up/down depending on stool consistency/frequency.   Continue to try to drink at least 32-64 oz of water daily (2-4 glasses/bottles of water daily).    Orders:  •  polyethylene glycol (GLYCOLAX) 17 GM/SCOOP powder; Take 17 g by mouth daily  •  senna (SENOKOT) 8.6 MG tablet; Take 1 tablet (8.6 mg total) by mouth daily Take 1 PO HS.    The patient is to schedule a follow up office visit in 4 months, but understands to call or contact our offices with any issues before then or as needed.     History of Present Illness   Annalise Do is a 24 y.o. female who presents today for a follow-up office visit regarding her chronic nausea, vomiting, GERD symptoms, epigastric pain, and irritable bowel syndrome symptoms with both constipation and diarrhea.  Since her last office visit the patient does feel that there has been some mild overall improvement in her GERD symptoms with the changes to her medication regimen, but she continues with the relatively chronic nausea and vomiting of bile/acid.  She continues with epigastric and bilateral upper quadrant abdominal pain and chronic pain after eating.  She also reports continued irritable bowel syndrome symptoms with constipation and what sounds like overflow diarrhea.  She does report she has been using Senokot and MiraLAX as needed with some improvement in her symptoms.    Since her last office visit she did proceed with the celiac/mesenteric ultrasound which did show evidence of celiac focal stenosis and then also followed up with Dr. Gonzales with the CT scan that showed possible evidence of the stenosis and is scheduled to proceed with a celiac plexus block with interventional radiology as a diagnostic tool to see if she would be a candidate to proceed with surgery to address the stenosis and hopefully improve her symptoms.    The patient currently denies any nausea, dysphagia, unplanned weight loss. Water Intake: Minimal per day.    The patient currently reports that they have a BM every day and reports that it is rarely relieving, without any straining, melena or bloody stools. Last BM: This morning. Flatus: Minimal.    GI  "Meds: Omeprazole 40 mg daily in the a.m., famotidine 40 mg at bedtime, Senokot at bedtime, MiraLAX as needed, Dulcolax suppositories as needed, and Zofran as needed for nausea.  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 No calcific atherosclerotic disease.     Endoscopy History: EGD: EGD: [facility] (1/2/25):  Mild duodenitis with focular gastric metaplasia.     COLONOSCOPY: (2022): Normal.       DUE: At 47    HPI  History obtained from: patient  Review of Systems A complete review of systems is negative other than that noted above in the HPI.      Current Medications[1]  Objective   /80   Pulse 98   Temp 98.2 °F (36.8 °C)   Resp 18   Ht 5' 2\" (1.575 m)   Wt 109 kg (240 lb)   SpO2 98%   BMI 43.90 kg/m²     Physical Exam   Abdomen is round, obese, moderately tender upon exam especially in the epigastric region and right upper quadrant without any significant guarding or rebound tenderness noted on exam, with hypoactive bowel sounds x 4.  Trace edema noted of the b/l lower extremities upon exam today. Skin is non-icteric.     Lab Results: I personally reviewed relevant lab results.                    [1]  Current Outpatient Medications   Medication Sig Dispense Refill   • acetaminophen (TYLENOL) 325 mg tablet Take 500 mg by mouth every 6 (six) hours as needed     • albuterol (PROVENTIL HFA,VENTOLIN HFA) 90 mcg/act inhaler Inhale 2 puffs every 6 (six) hours as needed     • ALPRAZolam (XANAX) 0.25 mg tablet Take 1 tablet (0.25 mg total) by mouth if needed for anxiety Take 1-2 tablets 1 hour prior to MRI. Do not drive or operate machinery. Please arrange for ride to and from MRI. Do not take any other sedative medications or alcohol while taking Xanax. 2 tablet 0   • aspirin-acetaminophen-caffeine (EXCEDRIN MIGRAINE) 250-250-65 MG per tablet Take 1 tablet by mouth every 6 (six) hours " as needed     • bisacodyl (DULCOLAX) 10 mg suppository Insert 1 suppository (10 mg total) into the rectum daily 12 suppository 0   • cyclobenzaprine (FLEXERIL) 5 mg tablet Take 5 mg by mouth if needed for muscle spasms As needed at night     • famotidine (PEPCID) 40 MG tablet Take 1 tablet (40 mg total) by mouth 2 (two) times a day 60 tablet 3   • levothyroxine 112 mcg tablet Take 1 tablet by mouth in the morning.     • Multiple Vitamin (MULTI VITAMIN DAILY PO) Take by mouth     • omeprazole (PriLOSEC) 40 MG capsule Take 1 capsule (40 mg total) by mouth daily 30 capsule 3   • ondansetron (ZOFRAN-ODT) 4 mg disintegrating tablet Take 1 tablet (4 mg total) by mouth every 6 (six) hours 45 tablet 3   • polyethylene glycol (GLYCOLAX) 17 GM/SCOOP powder Take 17 g by mouth daily 578 g 3   • senna (SENOKOT) 8.6 MG tablet Take 1 tablet (8.6 mg total) by mouth daily Take 1 PO HS. 30 tablet 3     No current facility-administered medications for this visit.

## 2025-05-22 NOTE — ASSESSMENT & PLAN NOTE
Orders:  •  omeprazole (PriLOSEC) 40 MG capsule; Take 1 capsule (40 mg total) by mouth daily  •  famotidine (PEPCID) 40 MG tablet; Take 1 tablet (40 mg total) by mouth 2 (two) times a day

## 2025-05-22 NOTE — ASSESSMENT & PLAN NOTE
Slightly Improved    Increased famotidine to 40 mg twice daily to try to help with some of the acid and bile like vomiting.  Continue omeprazole as prescribed.  Continue to avoid acidic or trigger foods much as possible.  Orders:  •  omeprazole (PriLOSEC) 40 MG capsule; Take 1 capsule (40 mg total) by mouth daily  •  famotidine (PEPCID) 40 MG tablet; Take 1 tablet (40 mg total) by mouth 2 (two) times a day

## 2025-05-22 NOTE — H&P (VIEW-ONLY)
Name: Annalise Do      : 2000      MRN: [id number]  Encounter Provider: SAMRA Osorio  Encounter Date: 2025   Encounter department: [facility]  :  Assessment & Plan  Nausea and vomiting, unspecified vomiting type  While the patient was in the office today, I discussed with the patient that I feel it is in her best interest to proceed with the celiac plexus block with interventional radiology as scheduled and to follow-up with Dr. Gonzales as scheduled as well.  The patient was agreeable and verbalized an understanding.    Continue to try to eat 5-6 small frequent meals daily.  Continue to take your time when eating, chewing thoroughly, and taking small sips of water in between bites.  Continue Zofran as needed for nausea and vomiting.  Orders:  •  ondansetron (ZOFRAN-ODT) 4 mg disintegrating tablet; Take 1 tablet (4 mg total) by mouth every 6 (six) hours    Gastroesophageal reflux disease, unspecified whether esophagitis present  Slightly Improved    Increased famotidine to 40 mg twice daily to try to help with some of the acid and bile like vomiting.  Continue omeprazole as prescribed.  Continue to avoid acidic or trigger foods much as possible.  Orders:  •  omeprazole (PriLOSEC) 40 MG capsule; Take 1 capsule (40 mg total) by mouth daily  •  famotidine (PEPCID) 40 MG tablet; Take 1 tablet (40 mg total) by mouth 2 (two) times a day    Epigastric pain  Orders:  •  omeprazole (PriLOSEC) 40 MG capsule; Take 1 capsule (40 mg total) by mouth daily  •  famotidine (PEPCID) 40 MG tablet; Take 1 tablet (40 mg total) by mouth 2 (two) times a day    Irritable bowel syndrome with both constipation and diarrhea  Slightly Improved    Start Miralax 1/4-1 full capful everyday or at least every other day, and can titrate up/down depending on stool consistency/frequency.   Continue to try to drink at least 32-64 oz of water daily (2-4 glasses/bottles of water daily).    Orders:  •  polyethylene glycol (GLYCOLAX) 17 GM/SCOOP powder; Take 17 g by mouth daily  •  senna (SENOKOT) 8.6 MG tablet; Take 1 tablet (8.6 mg total) by mouth daily Take 1 PO HS.    The patient is to schedule a follow up office visit in 4 months, but understands to call or contact our offices with any issues before then or as needed.     History of Present Illness   Annalise Do is a 24 y.o. female who presents today for a follow-up office visit regarding her chronic nausea, vomiting, GERD symptoms, epigastric pain, and irritable bowel syndrome symptoms with both constipation and diarrhea.  Since her last office visit the patient does feel that there has been some mild overall improvement in her GERD symptoms with the changes to her medication regimen, but she continues with the relatively chronic nausea and vomiting of bile/acid.  She continues with epigastric and bilateral upper quadrant abdominal pain and chronic pain after eating.  She also reports continued irritable bowel syndrome symptoms with constipation and what sounds like overflow diarrhea.  She does report she has been using Senokot and MiraLAX as needed with some improvement in her symptoms.    Since her last office visit she did proceed with the celiac/mesenteric ultrasound which did show evidence of celiac focal stenosis and then also followed up with Dr. Gonzales with the CT scan that showed possible evidence of the stenosis and is scheduled to proceed with a celiac plexus block with interventional radiology as a diagnostic tool to see if she would be a candidate to proceed with surgery to address the stenosis and hopefully improve her symptoms.    The patient currently denies any nausea, dysphagia, unplanned weight loss. Water Intake: Minimal per day.    The patient currently reports that they have a BM every day and reports that it is rarely relieving, without any straining, melena or bloody stools. Last BM: This morning. Flatus: Minimal.    GI  "Meds: Omeprazole 40 mg daily in the a.m., famotidine 40 mg at bedtime, Senokot at bedtime, MiraLAX as needed, Dulcolax suppositories as needed, and Zofran as needed for nausea.  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 No calcific atherosclerotic disease.     Endoscopy History: EGD: EGD: [facility] (1/2/25):  Mild duodenitis with focular gastric metaplasia.     COLONOSCOPY: (2022): Normal.       DUE: At 47    HPI  History obtained from: patient  Review of Systems A complete review of systems is negative other than that noted above in the HPI.      Current Medications[1]  Objective   /80   Pulse 98   Temp 98.2 °F (36.8 °C)   Resp 18   Ht 5' 2\" (1.575 m)   Wt 109 kg (240 lb)   SpO2 98%   BMI 43.90 kg/m²     Physical Exam   Abdomen is round, obese, moderately tender upon exam especially in the epigastric region and right upper quadrant without any significant guarding or rebound tenderness noted on exam, with hypoactive bowel sounds x 4.  Trace edema noted of the b/l lower extremities upon exam today. Skin is non-icteric.     Lab Results: I personally reviewed relevant lab results.                    [1]  Current Outpatient Medications   Medication Sig Dispense Refill   • acetaminophen (TYLENOL) 325 mg tablet Take 500 mg by mouth every 6 (six) hours as needed     • albuterol (PROVENTIL HFA,VENTOLIN HFA) 90 mcg/act inhaler Inhale 2 puffs every 6 (six) hours as needed     • ALPRAZolam (XANAX) 0.25 mg tablet Take 1 tablet (0.25 mg total) by mouth if needed for anxiety Take 1-2 tablets 1 hour prior to MRI. Do not drive or operate machinery. Please arrange for ride to and from MRI. Do not take any other sedative medications or alcohol while taking Xanax. 2 tablet 0   • aspirin-acetaminophen-caffeine (EXCEDRIN MIGRAINE) 250-250-65 MG per tablet Take 1 tablet by mouth every 6 (six) hours " as needed     • bisacodyl (DULCOLAX) 10 mg suppository Insert 1 suppository (10 mg total) into the rectum daily 12 suppository 0   • cyclobenzaprine (FLEXERIL) 5 mg tablet Take 5 mg by mouth if needed for muscle spasms As needed at night     • famotidine (PEPCID) 40 MG tablet Take 1 tablet (40 mg total) by mouth 2 (two) times a day 60 tablet 3   • levothyroxine 112 mcg tablet Take 1 tablet by mouth in the morning.     • Multiple Vitamin (MULTI VITAMIN DAILY PO) Take by mouth     • omeprazole (PriLOSEC) 40 MG capsule Take 1 capsule (40 mg total) by mouth daily 30 capsule 3   • ondansetron (ZOFRAN-ODT) 4 mg disintegrating tablet Take 1 tablet (4 mg total) by mouth every 6 (six) hours 45 tablet 3   • polyethylene glycol (GLYCOLAX) 17 GM/SCOOP powder Take 17 g by mouth daily 578 g 3   • senna (SENOKOT) 8.6 MG tablet Take 1 tablet (8.6 mg total) by mouth daily Take 1 PO HS. 30 tablet 3     No current facility-administered medications for this visit.

## 2025-05-22 NOTE — ASSESSMENT & PLAN NOTE
While the patient was in the office today, I discussed with the patient that I feel it is in her best interest to proceed with the celiac plexus block with interventional radiology as scheduled and to follow-up with Dr. Gonzales as scheduled as well.  The patient was agreeable and verbalized an understanding.    Continue to try to eat 5-6 small frequent meals daily.  Continue to take your time when eating, chewing thoroughly, and taking small sips of water in between bites.  Continue Zofran as needed for nausea and vomiting.  Orders:  •  ondansetron (ZOFRAN-ODT) 4 mg disintegrating tablet; Take 1 tablet (4 mg total) by mouth every 6 (six) hours

## 2025-05-22 NOTE — ASSESSMENT & PLAN NOTE
Slightly Improved    Start Miralax 1/4-1 full capful everyday or at least every other day, and can titrate up/down depending on stool consistency/frequency.   Continue to try to drink at least 32-64 oz of water daily (2-4 glasses/bottles of water daily).   Orders:  •  polyethylene glycol (GLYCOLAX) 17 GM/SCOOP powder; Take 17 g by mouth daily  •  senna (SENOKOT) 8.6 MG tablet; Take 1 tablet (8.6 mg total) by mouth daily Take 1 PO HS.

## 2025-05-28 ENCOUNTER — RESULTS FOLLOW-UP (OUTPATIENT)
Dept: NEUROLOGY | Facility: CLINIC | Age: 25
End: 2025-05-28

## 2025-05-28 ENCOUNTER — TELEPHONE (OUTPATIENT)
Age: 25
End: 2025-05-28

## 2025-05-28 ENCOUNTER — HOSPITAL ENCOUNTER (OUTPATIENT)
Dept: RADIOLOGY | Facility: IMAGING CENTER | Age: 25
Discharge: HOME | End: 2025-05-28
Attending: PSYCHIATRY & NEUROLOGY
Payer: COMMERCIAL

## 2025-05-28 DIAGNOSIS — E34.9 ENDOCRINOPATHY: ICD-10-CM

## 2025-05-28 PROCEDURE — A9585 GADOBUTROL INJECTION: HCPCS | Performed by: PSYCHIATRY & NEUROLOGY

## 2025-05-28 PROCEDURE — 70553 MRI BRAIN STEM W/O & W/DYE: CPT

## 2025-05-28 RX ORDER — GADOBUTROL 604.72 MG/ML
10 INJECTION INTRAVENOUS
Status: COMPLETED | OUTPATIENT
Start: 2025-05-28 | End: 2025-05-28

## 2025-05-28 RX ADMIN — GADOBUTROL 10 ML: 604.72 INJECTION INTRAVENOUS at 08:54

## 2025-05-28 NOTE — TELEPHONE ENCOUNTER
Pt called re: MRI brain pituitary results.    [phone] - okay to leave detailed msg.     Dr. Victoria - please advise.

## 2025-06-02 ENCOUNTER — TELEPHONE (OUTPATIENT)
Dept: INTERVENTIONAL RADIOLOGY/VASCULAR | Facility: HOSPITAL | Age: 25
End: 2025-06-02

## 2025-06-02 NOTE — PROGRESS NOTES
Called to go over pre procedure information. Patient to arrive at 0730, has a ride to and from, and NPO after midnight the night before. Consult complete.

## 2025-06-13 ENCOUNTER — HOSPITAL ENCOUNTER (OUTPATIENT)
Dept: CT IMAGING | Facility: HOSPITAL | Age: 25
End: 2025-06-13
Attending: RADIOLOGY
Payer: COMMERCIAL

## 2025-06-13 ENCOUNTER — TELEPHONE (OUTPATIENT)
Age: 25
End: 2025-06-13

## 2025-06-13 VITALS
RESPIRATION RATE: 17 BRPM | TEMPERATURE: 98.2 F | SYSTOLIC BLOOD PRESSURE: 129 MMHG | BODY MASS INDEX: 43.61 KG/M2 | OXYGEN SATURATION: 97 % | HEART RATE: 88 BPM | WEIGHT: 237 LBS | HEIGHT: 62 IN | DIASTOLIC BLOOD PRESSURE: 73 MMHG

## 2025-06-13 DIAGNOSIS — I77.4 MEDIAN ARCUATE LIGAMENT SYNDROME (HCC): ICD-10-CM

## 2025-06-13 LAB
BASOPHILS # BLD AUTO: 0.04 THOUSANDS/ÂΜL (ref 0–0.1)
BASOPHILS NFR BLD AUTO: 1 % (ref 0–1)
EOSINOPHIL # BLD AUTO: 0.12 THOUSAND/ÂΜL (ref 0–0.61)
EOSINOPHIL NFR BLD AUTO: 2 % (ref 0–6)
ERYTHROCYTE [DISTWIDTH] IN BLOOD BY AUTOMATED COUNT: 12.6 % (ref 11.6–15.1)
EXT PREGNANCY TEST URINE: NEGATIVE
EXT. CONTROL: NORMAL
HCT VFR BLD AUTO: 39.7 % (ref 34.8–46.1)
HGB BLD-MCNC: 12.8 G/DL (ref 11.5–15.4)
IMM GRANULOCYTES # BLD AUTO: 0.03 THOUSAND/UL (ref 0–0.2)
IMM GRANULOCYTES NFR BLD AUTO: 0 % (ref 0–2)
INR PPP: 0.93 (ref 0.85–1.19)
LYMPHOCYTES # BLD AUTO: 2.8 THOUSANDS/ÂΜL (ref 0.6–4.47)
LYMPHOCYTES NFR BLD AUTO: 38 % (ref 14–44)
MCH RBC QN AUTO: 28.8 PG (ref 26.8–34.3)
MCHC RBC AUTO-ENTMCNC: 32.2 G/DL (ref 31.4–37.4)
MCV RBC AUTO: 89 FL (ref 82–98)
MONOCYTES # BLD AUTO: 0.54 THOUSAND/ÂΜL (ref 0.17–1.22)
MONOCYTES NFR BLD AUTO: 7 % (ref 4–12)
NEUTROPHILS # BLD AUTO: 3.94 THOUSANDS/ÂΜL (ref 1.85–7.62)
NEUTS SEG NFR BLD AUTO: 52 % (ref 43–75)
NRBC BLD AUTO-RTO: 0 /100 WBCS
PLATELET # BLD AUTO: 275 THOUSANDS/UL (ref 149–390)
PMV BLD AUTO: 9.2 FL (ref 8.9–12.7)
PROTHROMBIN TIME: 13 SECONDS (ref 12.3–15)
RBC # BLD AUTO: 4.44 MILLION/UL (ref 3.81–5.12)
WBC # BLD AUTO: 7.47 THOUSAND/UL (ref 4.31–10.16)

## 2025-06-13 PROCEDURE — 85610 PROTHROMBIN TIME: CPT | Performed by: RADIOLOGY

## 2025-06-13 PROCEDURE — 85025 COMPLETE CBC W/AUTO DIFF WBC: CPT | Performed by: RADIOLOGY

## 2025-06-13 PROCEDURE — 77012 CT SCAN FOR NEEDLE BIOPSY: CPT

## 2025-06-13 PROCEDURE — 64530 N BLOCK INJ CELIAC PELUS: CPT | Performed by: RADIOLOGY

## 2025-06-13 PROCEDURE — 81025 URINE PREGNANCY TEST: CPT | Performed by: RADIOLOGY

## 2025-06-13 PROCEDURE — 99152 MOD SED SAME PHYS/QHP 5/>YRS: CPT | Performed by: RADIOLOGY

## 2025-06-13 PROCEDURE — 99153 MOD SED SAME PHYS/QHP EA: CPT

## 2025-06-13 PROCEDURE — 64530 N BLOCK INJ CELIAC PELUS: CPT

## 2025-06-13 PROCEDURE — 99152 MOD SED SAME PHYS/QHP 5/>YRS: CPT

## 2025-06-13 RX ORDER — BUPIVACAINE HYDROCHLORIDE 5 MG/ML
INJECTION, SOLUTION EPIDURAL; INTRACAUDAL; PERINEURAL AS NEEDED
Status: COMPLETED | OUTPATIENT
Start: 2025-06-13 | End: 2025-06-13

## 2025-06-13 RX ORDER — ONDANSETRON 2 MG/ML
4 INJECTION INTRAMUSCULAR; INTRAVENOUS EVERY 6 HOURS PRN
Status: DISCONTINUED | OUTPATIENT
Start: 2025-06-13 | End: 2025-06-17 | Stop reason: HOSPADM

## 2025-06-13 RX ORDER — MIDAZOLAM HYDROCHLORIDE 2 MG/2ML
INJECTION, SOLUTION INTRAMUSCULAR; INTRAVENOUS AS NEEDED
Status: COMPLETED | OUTPATIENT
Start: 2025-06-13 | End: 2025-06-13

## 2025-06-13 RX ORDER — FENTANYL CITRATE 50 UG/ML
INJECTION, SOLUTION INTRAMUSCULAR; INTRAVENOUS AS NEEDED
Status: COMPLETED | OUTPATIENT
Start: 2025-06-13 | End: 2025-06-13

## 2025-06-13 RX ORDER — LIDOCAINE WITH 8.4% SOD BICARB 0.9%(10ML)
SYRINGE (ML) INJECTION AS NEEDED
Status: COMPLETED | OUTPATIENT
Start: 2025-06-13 | End: 2025-06-13

## 2025-06-13 RX ORDER — SODIUM CHLORIDE 9 MG/ML
30 INJECTION, SOLUTION INTRAVENOUS CONTINUOUS
Status: DISCONTINUED | OUTPATIENT
Start: 2025-06-13 | End: 2025-06-17 | Stop reason: HOSPADM

## 2025-06-13 RX ADMIN — BUPIVACAINE HYDROCHLORIDE 10 ML: 5 INJECTION, SOLUTION EPIDURAL; INTRACAUDAL; PERINEURAL at 09:42

## 2025-06-13 RX ADMIN — IOHEXOL 6 ML: 350 INJECTION, SOLUTION INTRAVENOUS at 09:30

## 2025-06-13 RX ADMIN — MIDAZOLAM 1 MG: 1 INJECTION INTRAMUSCULAR; INTRAVENOUS at 09:06

## 2025-06-13 RX ADMIN — Medication 10 ML: at 09:30

## 2025-06-13 RX ADMIN — FENTANYL CITRATE 50 MCG: 50 INJECTION, SOLUTION INTRAMUSCULAR; INTRAVENOUS at 09:06

## 2025-06-13 RX ADMIN — BUPIVACAINE HYDROCHLORIDE 10 ML: 5 INJECTION, SOLUTION EPIDURAL; INTRACAUDAL; PERINEURAL at 09:49

## 2025-06-13 RX ADMIN — FENTANYL CITRATE 50 MCG: 50 INJECTION, SOLUTION INTRAMUSCULAR; INTRAVENOUS at 09:32

## 2025-06-13 RX ADMIN — BUPIVACAINE HYDROCHLORIDE 10 ML: 5 INJECTION, SOLUTION EPIDURAL; INTRACAUDAL; PERINEURAL at 09:38

## 2025-06-13 RX ADMIN — MIDAZOLAM 1 MG: 1 INJECTION INTRAMUSCULAR; INTRAVENOUS at 09:32

## 2025-06-13 RX ADMIN — IOHEXOL 6 ML: 350 INJECTION, SOLUTION INTRAVENOUS at 10:24

## 2025-06-13 RX ADMIN — MIDAZOLAM 1 MG: 1 INJECTION INTRAMUSCULAR; INTRAVENOUS at 09:14

## 2025-06-13 RX ADMIN — MIDAZOLAM 1 MG: 1 INJECTION INTRAMUSCULAR; INTRAVENOUS at 09:01

## 2025-06-13 RX ADMIN — FENTANYL CITRATE 50 MCG: 50 INJECTION, SOLUTION INTRAMUSCULAR; INTRAVENOUS at 09:01

## 2025-06-13 RX ADMIN — FENTANYL CITRATE 50 MCG: 50 INJECTION, SOLUTION INTRAMUSCULAR; INTRAVENOUS at 09:14

## 2025-06-13 RX ADMIN — SODIUM CHLORIDE 30 ML/HR: 0.9 INJECTION, SOLUTION INTRAVENOUS at 08:15

## 2025-06-13 RX ADMIN — Medication 10 ML: at 09:22

## 2025-06-13 RX ADMIN — ONDANSETRON 4 MG: 2 INJECTION INTRAMUSCULAR; INTRAVENOUS at 08:43

## 2025-06-13 NOTE — INTERVAL H&P NOTE
Update: (This section must be completed if the H&P was completed greater than 24 hrs to procedure or admission)    H&P reviewed. After examining the patient, I find no changed to the H&P since it had been written.    24-year-old, longstanding abdominal pain    Has seen gastroenterology, surgery    Celiac plexus block has been discussed to assist surgery with planning    Risks benefits alternatives reviewed informed consent obtained    We will provide sedation    BMI 43    N.p.o.    No thinners    Birth control    Mp2 asa3    Patient re-evaluated. Accept as history and physical.    Caryn Sánchez MD/June 13, 2025/9:05 AM

## 2025-06-13 NOTE — BRIEF OP NOTE (RAD/CATH)
INTERVENTIONAL RADIOLOGY PROCEDURE NOTE    Date: 6/13/2025    Procedure:   Procedure Summary       Date: 06/13/25 Room / Location: [facility] CAT Scan    Anesthesia Start:  Anesthesia Stop:     Procedure: IR CELIAC BLOCK Diagnosis:       Median arcuate ligament syndrome (HCC)      (? celiac stenosis MALS)    Scheduled Providers: Caryn Sánchez MD Responsible Provider:     Anesthesia Type: Not recorded ASA Status: Not recorded            Preoperative diagnosis:   1. Median arcuate ligament syndrome (HCC)         Postoperative diagnosis: Same.    Surgeon: Caryn Sánchez MD     Assistant: None. No qualified resident was available.    Blood loss: 0    Specimens: 0     Findings:     Bupivacaine 0.5% 30 cc celiac block    One approach from right near diaphragm    Two approaches from left  Retrocrural and antecrural      Lidocaine also used    Complications: None immediate.    Anesthesia: conscious sedation

## 2025-06-13 NOTE — TELEPHONE ENCOUNTER
Patient called to schedule appointment our soonest appointment 08/1/2025 at 10:15 am. Patient wants to know if Dr Gonzales wanted to see her sooner. Please call patient at [phone]

## 2025-06-19 ENCOUNTER — OFFICE VISIT (OUTPATIENT)
Dept: SURGERY | Facility: CLINIC | Age: 25
End: 2025-06-19
Payer: COMMERCIAL

## 2025-06-19 DIAGNOSIS — I77.4 MEDIAN ARCUATE LIGAMENT SYNDROME (HCC): Primary | ICD-10-CM

## 2025-06-19 PROCEDURE — 99215 OFFICE O/P EST HI 40 MIN: CPT | Performed by: SURGERY

## 2025-06-19 NOTE — PROGRESS NOTES
Name: Annalise Do      : 2000      MRN: [id number]  Encounter Provider: Avelino Gonzales MD  Encounter Date: 2025   Encounter department: [facility] [place]  :  Assessment & Plan  Median arcuate ligament syndrome (HCC)  24-year-old female with median arcuate ligament syndrome.    Plan:  I had a long conversation with the patient and her mother regarding her workup to this point and best next steps.  She is interested in pursuing robotic median arcuate ligament release.  We discussed the risks and benefits associated with the surgery and specifically discussed the risks associated with surgery including but not limited to: Bleeding, ischemia to intra-abdominal organs, and failure to improve.  I specifically discussed with her that her obesity puts her at higher risk for bleeding given difficulty in identifying anatomy in and around the aorta.  Additionally we discussed generally with median arcuate ligament syndrome that even with a perfect operation, not all patients have improvement in their symptomatology.  After this discussion consent was signed.    We will get her scheduled expeditiously for robotic median arcuate ligament release.  We will have 2 units of PRBCs on-call in case of bleeding.    Orders:    Case request operating room: Robotic Median arcuate ligament release; Standing        History of Present Illness   HPI  Annalise Do is a 24 y.o. female who presents with median arcuate ligament syndrome.  Since our last visit she underwent CTA of the abdomen pelvis on 3/13/2025 which I reviewed in PACS which shows mild to moderate stenosis of the celiac at its origin with mild poststenotic dilatation.  There is no calcific disease within the celiac.  She subsequently underwent celiac plexus block on 2025 which are reviewed as well as the notes from Dr. Sánchez.  Per the patient she had 85% improvement in her epigastric pain as well as nausea and bloating for  approximately 16 hours after the procedure.  The next morning when she woke up she had complete return of her symptomatology.  She continues to complain of upper abdominal pain nausea, and postprandial fullness and bloating.  History obtained from: patient    Review of Systems  Past Medical History   Past Medical History[1]  Past Surgical History[2]  Family History[3]   reports that she has never smoked. She has never used smokeless tobacco. She reports that she does not drink alcohol and does not use drugs.  Current Outpatient Medications   Medication Instructions    acetaminophen (TYLENOL) 500 mg, Every 6 hours PRN    albuterol (PROVENTIL HFA,VENTOLIN HFA) 90 mcg/act inhaler 2 puffs, Every 6 hours PRN    ALPRAZolam (XANAX) 0.25 mg, Oral, As needed, Take 1-2 tablets 1 hour prior to MRI. Do not drive or operate machinery. Please arrange for ride to and from MRI. Do not take any other sedative medications or alcohol while taking Xanax.    aspirin-acetaminophen-caffeine (EXCEDRIN MIGRAINE) 250-250-65 MG per tablet 1 tablet, Every 6 hours PRN    bisacodyl (DULCOLAX) 10 mg, Rectal, Daily    cyclobenzaprine (FLEXERIL) 5 mg, As needed    famotidine (PEPCID) 40 mg, Oral, 2 times daily    levothyroxine 112 mcg tablet 1 tablet, Daily    Multiple Vitamin (MULTI VITAMIN DAILY PO) Take by mouth    omeprazole (PRILOSEC) 40 mg, Oral, Daily    ondansetron (ZOFRAN-ODT) 4 mg, Oral, Every 6 hours scheduled    polyethylene glycol (GLYCOLAX) 17 g, Oral, Daily    senna (SENOKOT) 8.6 mg, Oral, Daily, Take 1 PO HS.   Allergies[4]   Medications Ordered Prior to Encounter[5]   Social History[6]     Objective   There were no vitals taken for this visit.     Physical Exam  Vitals and nursing note reviewed.   Constitutional:       General: She is not in acute distress.     Appearance: Normal appearance. She is obese. She is not ill-appearing.   HENT:      Head: Normocephalic and atraumatic.      Mouth/Throat:      Mouth: Mucous membranes are  moist.     Eyes:      Extraocular Movements: Extraocular movements intact.       Cardiovascular:      Rate and Rhythm: Normal rate and regular rhythm.   Pulmonary:      Effort: Pulmonary effort is normal. No respiratory distress.      Breath sounds: Normal breath sounds. No stridor.   Abdominal:      General: There is no distension.      Palpations: Abdomen is soft. There is no mass.      Tenderness: There is no abdominal tenderness.      Hernia: No hernia is present.     Musculoskeletal:         General: No swelling or tenderness. Normal range of motion.     Skin:     General: Skin is warm and dry.      Findings: No lesion.     Neurological:      General: No focal deficit present.      Mental Status: She is alert and oriented to person, place, and time.     Psychiatric:         Mood and Affect: Mood normal.         Behavior: Behavior normal.                [1]   Past Medical History:  Diagnosis Date    Anxiety     Asthma     Constipation     Disease of thyroid gland     hashimotos    GERD (gastroesophageal reflux disease)     Goiter     IBS (irritable bowel syndrome)     Migraine     PONV (postoperative nausea and vomiting)    [2]   Past Surgical History:  Procedure Laterality Date    IR CELIAC BLOCK  6/13/2025    LIPOSUCTION Bilateral     on back of neck 2 times    PARATHYROIDECTOMY Bilateral     only one taken out and one reattach    THYROIDECTOMY Bilateral     TONSILLECTOMY     [3] No family history on file.  [4]   Allergies  Allergen Reactions    Morphine Other (See Comments)    Pollen Extract Nasal Congestion    Cephalexin Rash    Prochlorperazine Irritability   [5]   Current Outpatient Medications on File Prior to Visit   Medication Sig Dispense Refill    acetaminophen (TYLENOL) 325 mg tablet Take 500 mg by mouth every 6 (six) hours as needed      albuterol (PROVENTIL HFA,VENTOLIN HFA) 90 mcg/act inhaler Inhale 2 puffs every 6 (six) hours as needed      ALPRAZolam (XANAX) 0.25 mg tablet Take 1 tablet (0.25 mg  total) by mouth if needed for anxiety Take 1-2 tablets 1 hour prior to MRI. Do not drive or operate machinery. Please arrange for ride to and from MRI. Do not take any other sedative medications or alcohol while taking Xanax. 2 tablet 0    aspirin-acetaminophen-caffeine (EXCEDRIN MIGRAINE) 250-250-65 MG per tablet Take 1 tablet by mouth every 6 (six) hours as needed      bisacodyl (DULCOLAX) 10 mg suppository Insert 1 suppository (10 mg total) into the rectum daily 12 suppository 0    cyclobenzaprine (FLEXERIL) 5 mg tablet Take 5 mg by mouth if needed for muscle spasms As needed at night      famotidine (PEPCID) 40 MG tablet Take 1 tablet (40 mg total) by mouth 2 (two) times a day 60 tablet 3    levothyroxine 112 mcg tablet Take 1 tablet by mouth in the morning.      Multiple Vitamin (MULTI VITAMIN DAILY PO) Take by mouth      omeprazole (PriLOSEC) 40 MG capsule Take 1 capsule (40 mg total) by mouth daily 30 capsule 3    ondansetron (ZOFRAN-ODT) 4 mg disintegrating tablet Take 1 tablet (4 mg total) by mouth every 6 (six) hours 45 tablet 3    polyethylene glycol (GLYCOLAX) 17 GM/SCOOP powder Take 17 g by mouth daily 578 g 3    senna (SENOKOT) 8.6 MG tablet Take 1 tablet (8.6 mg total) by mouth daily Take 1 PO HS. 30 tablet 3     No current facility-administered medications on file prior to visit.   [6]   Social History  Tobacco Use    Smoking status: Never    Smokeless tobacco: Never   Vaping Use    Vaping status: Never Used   Substance and Sexual Activity    Alcohol use: Never    Drug use: Never

## 2025-06-19 NOTE — ASSESSMENT & PLAN NOTE
24-year-old female with median arcuate ligament syndrome.    Plan:  I had a long conversation with the patient and her mother regarding her workup to this point and best next steps.  She is interested in pursuing robotic median arcuate ligament release.  We discussed the risks and benefits associated with the surgery and specifically discussed the risks associated with surgery including but not limited to: Bleeding, ischemia to intra-abdominal organs, and failure to improve.  I specifically discussed with her that her obesity puts her at higher risk for bleeding given difficulty in identifying anatomy in and around the aorta.  Additionally we discussed generally with median arcuate ligament syndrome that even with a perfect operation, not all patients have improvement in their symptomatology.  After this discussion consent was signed.    We will get her scheduled expeditiously for robotic median arcuate ligament release.  We will have 2 units of PRBCs on-call in case of bleeding.    Orders:    Case request operating room: Robotic Median arcuate ligament release; Standing

## 2025-06-23 ENCOUNTER — TELEPHONE (OUTPATIENT)
Dept: INTERVENTIONAL RADIOLOGY/VASCULAR | Facility: CLINIC | Age: 25
End: 2025-06-23

## 2025-06-23 NOTE — TELEPHONE ENCOUNTER
Called after celiac block June 13    Patient has since seen surgery, notes are available    She describes that pain went away instantly  Had toast without any of her unfortunate usual GI issues    The pain came back the next day     She is planning on median arcuate ligament release surgery in October    She does describe that after the procedure she had back pain  This is often related to the positioning or needle access  Thankfully it is getting better    At this point no further questions    Surgery service will continue with management

## 2025-06-24 DIAGNOSIS — I77.4 MEDIAN ARCUATE LIGAMENT SYNDROME (HCC): Primary | ICD-10-CM

## 2025-07-01 ENCOUNTER — OFFICE VISIT (OUTPATIENT)
Dept: ENDOCRINOLOGY | Facility: CLINIC | Age: 25
End: 2025-07-01
Payer: COMMERCIAL

## 2025-07-01 VITALS
BODY MASS INDEX: 44.24 KG/M2 | HEIGHT: 62 IN | WEIGHT: 240.4 LBS | DIASTOLIC BLOOD PRESSURE: 78 MMHG | SYSTOLIC BLOOD PRESSURE: 128 MMHG

## 2025-07-01 DIAGNOSIS — E24.9 HYPERCORTISOLISM (HCC): Primary | ICD-10-CM

## 2025-07-01 DIAGNOSIS — E89.0 POSTOPERATIVE HYPOTHYROIDISM: ICD-10-CM

## 2025-07-01 DIAGNOSIS — E34.9 ENDOCRINOPATHY: ICD-10-CM

## 2025-07-01 PROCEDURE — 99244 OFF/OP CNSLTJ NEW/EST MOD 40: CPT | Performed by: INTERNAL MEDICINE

## 2025-07-01 RX ORDER — DEXAMETHASONE 1 MG
1 TABLET ORAL ONCE
Qty: 1 TABLET | Refills: 0 | Status: SHIPPED | OUTPATIENT
Start: 2025-07-01 | End: 2025-07-01

## 2025-07-01 RX ORDER — LEVOTHYROXINE SODIUM 125 UG/1
125 TABLET ORAL DAILY
Qty: 90 TABLET | Refills: 1 | Status: SHIPPED | OUTPATIENT
Start: 2025-07-01

## 2025-07-01 RX ORDER — ESCITALOPRAM OXALATE 5 MG/1
5 TABLET ORAL DAILY
COMMUNITY
Start: 2025-06-06 | End: 2026-06-06
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
## 2025-07-01 NOTE — ASSESSMENT & PLAN NOTE
As above  Orders:  •  Ambulatory Referral to Endocrinology  •  Cortisol Level,7-9 AM Specimen; Future  •  dexamethasone (DECADRON) 1 mg tablet; Take 1 tablet (1 mg total) by mouth 1 (one) time for 1 dose Take the night prior to your Dexamethasone Suppression test (DST) at around 10 pm  •  SALIVARY CORTISOL,THREE SPECIMENS; Future  •  Dexamethasone; Future

## 2025-07-01 NOTE — PATIENT INSTRUCTIONS
Salivary Cortisol Collection Instructions:  1. Do not brush teeth before collecting specimen.   2. Do not eat or drink for 15 minutes prior to specimen collection, but rinse mouth thoroughly with water.   3. Collect specimen between 10 p.m. and midnight, and record collection time.   4. Preferred collection unit is a Salivette tube.   5. To use the Salivette tube:   a. Remove top cap of container to expose swab.   b. Place swab directly into mouth by tipping container so swab falls into mouth. Do not touch the swab with your fingers.   c. Keep the swab in mouth for approximately one minute. Roll the swab in mouth, do not chew swab.   d. Place the swab back into its container without touching, and replace the cap.   e. Record collection time and date, and label the Salivette tube with your name and birthdate. Keep the Salivette tube in the refrigerator until bringing to the lab.   f. Bring or send appropriately labeled Salivette tube to the laboratory.     Multiple specimens may be required by you ordering physician. Collect each specimen in a separate container and label with your name and the date and time of collection.      Complete on the week after your salivary testing - Dexamethasone suppression test   Dexamethasone suppression test (DST) - Take 1 tablet (1 mg total) of Dexamethasone by mouth for 1 dose the night prior at around 10 pm for your Dexamethasone Suppression test (DST).  The following morning, get lab done while fasting and between 7 and 9 am.    For Hypothyroidism:   Increase levothyroxine to 125 cmg daily  After 6 to 8 weeks of the increased dose, please complete lab work for TSH and Free T4 levels.

## 2025-07-23 ENCOUNTER — TELEPHONE (OUTPATIENT)
Age: 25
End: 2025-07-23

## 2025-07-23 ENCOUNTER — APPOINTMENT (OUTPATIENT)
Dept: LAB | Age: 25
End: 2025-07-23

## 2025-07-23 NOTE — TELEPHONE ENCOUNTER
Patient called asking how to collect salivary cortisol test. Made aware per instructions:    Do not brush teeth prior to collection  Do not eat or drink for 15 minutes prior to collection  Collect specimen between 10 pm and midnight and record collection time    Patient verbalized understanding.